# Patient Record
Sex: FEMALE | Race: WHITE | Employment: OTHER | URBAN - METROPOLITAN AREA
[De-identification: names, ages, dates, MRNs, and addresses within clinical notes are randomized per-mention and may not be internally consistent; named-entity substitution may affect disease eponyms.]

---

## 2019-03-01 ENCOUNTER — HOSPITAL ENCOUNTER (OUTPATIENT)
Dept: CARDIAC REHAB | Age: 74
Setting detail: THERAPIES SERIES
Discharge: HOME | End: 2019-03-01
Payer: MEDICARE

## 2019-03-01 PROCEDURE — 93798 PHYS/QHP OP CAR RHAB W/ECG: CPT

## 2019-03-01 RX ORDER — AMIODARONE HYDROCHLORIDE 200 MG/1
200 TABLET ORAL DAILY
COMMUNITY

## 2019-03-01 RX ORDER — NITROGLYCERIN 0.4 MG/1
0.4 TABLET SUBLINGUAL EVERY 5 MIN PRN
COMMUNITY

## 2019-03-01 RX ORDER — BUDESONIDE AND FORMOTEROL FUMARATE DIHYDRATE 80; 4.5 UG/1; UG/1
2 AEROSOL RESPIRATORY (INHALATION) 2 TIMES DAILY
COMMUNITY

## 2019-03-01 RX ORDER — LOSARTAN POTASSIUM 50 MG/1
50 TABLET ORAL DAILY
COMMUNITY

## 2019-03-01 RX ORDER — M-VIT,TX,IRON,MINS/CALC/FOLIC 27MG-0.4MG
1 TABLET ORAL DAILY
COMMUNITY

## 2019-03-01 RX ORDER — FENOFIBRATE 160 MG/1
160 TABLET ORAL DAILY
COMMUNITY

## 2019-03-01 RX ORDER — CARVEDILOL 25 MG/1
25 TABLET ORAL 2 TIMES DAILY WITH MEALS
COMMUNITY

## 2019-03-01 RX ORDER — CLOPIDOGREL BISULFATE 75 MG/1
75 TABLET ORAL DAILY
COMMUNITY

## 2019-03-01 RX ORDER — ATORVASTATIN CALCIUM 40 MG/1
40 TABLET, FILM COATED ORAL DAILY
COMMUNITY

## 2019-03-04 ENCOUNTER — HOSPITAL ENCOUNTER (OUTPATIENT)
Dept: CARDIAC REHAB | Age: 74
Setting detail: THERAPIES SERIES
Discharge: HOME | End: 2019-03-04
Payer: MEDICARE

## 2019-03-04 PROCEDURE — 93798 PHYS/QHP OP CAR RHAB W/ECG: CPT

## 2019-03-06 ENCOUNTER — HOSPITAL ENCOUNTER (OUTPATIENT)
Dept: CARDIAC REHAB | Age: 74
Setting detail: THERAPIES SERIES
Discharge: HOME | End: 2019-03-06
Payer: MEDICARE

## 2019-03-06 PROCEDURE — 93798 PHYS/QHP OP CAR RHAB W/ECG: CPT

## 2019-03-08 ENCOUNTER — HOSPITAL ENCOUNTER (OUTPATIENT)
Dept: CARDIAC REHAB | Age: 74
Setting detail: THERAPIES SERIES
Discharge: HOME | End: 2019-03-08
Payer: MEDICARE

## 2019-03-08 PROCEDURE — 93798 PHYS/QHP OP CAR RHAB W/ECG: CPT

## 2019-03-11 ENCOUNTER — HOSPITAL ENCOUNTER (OUTPATIENT)
Dept: CARDIAC REHAB | Age: 74
Setting detail: THERAPIES SERIES
Discharge: HOME | End: 2019-03-11
Payer: MEDICARE

## 2019-03-11 PROCEDURE — 93798 PHYS/QHP OP CAR RHAB W/ECG: CPT

## 2019-03-13 ENCOUNTER — HOSPITAL ENCOUNTER (OUTPATIENT)
Dept: CARDIAC REHAB | Age: 74
Setting detail: THERAPIES SERIES
Discharge: HOME | End: 2019-03-13
Payer: MEDICARE

## 2019-03-13 PROCEDURE — 93798 PHYS/QHP OP CAR RHAB W/ECG: CPT

## 2019-03-15 ENCOUNTER — HOSPITAL ENCOUNTER (OUTPATIENT)
Dept: CARDIAC REHAB | Age: 74
Setting detail: THERAPIES SERIES
Discharge: HOME | End: 2019-03-15
Payer: MEDICARE

## 2019-03-15 PROCEDURE — 93798 PHYS/QHP OP CAR RHAB W/ECG: CPT

## 2019-03-18 ENCOUNTER — HOSPITAL ENCOUNTER (OUTPATIENT)
Dept: CARDIAC REHAB | Age: 74
Setting detail: THERAPIES SERIES
Discharge: HOME | End: 2019-03-18
Payer: MEDICARE

## 2019-03-18 PROCEDURE — 93798 PHYS/QHP OP CAR RHAB W/ECG: CPT

## 2019-03-20 ENCOUNTER — HOSPITAL ENCOUNTER (OUTPATIENT)
Dept: CARDIAC REHAB | Age: 74
Setting detail: THERAPIES SERIES
Discharge: HOME | End: 2019-03-20
Payer: MEDICARE

## 2019-03-20 PROCEDURE — 93798 PHYS/QHP OP CAR RHAB W/ECG: CPT

## 2019-03-22 ENCOUNTER — HOSPITAL ENCOUNTER (OUTPATIENT)
Dept: CARDIAC REHAB | Age: 74
Setting detail: THERAPIES SERIES
Discharge: HOME | End: 2019-03-22
Payer: MEDICARE

## 2019-03-22 ENCOUNTER — APPOINTMENT (OUTPATIENT)
Dept: CARDIAC REHAB | Age: 74
End: 2019-03-22
Payer: MEDICARE

## 2019-03-22 PROCEDURE — 93798 PHYS/QHP OP CAR RHAB W/ECG: CPT

## 2019-03-25 ENCOUNTER — APPOINTMENT (OUTPATIENT)
Dept: CARDIAC REHAB | Age: 74
End: 2019-03-25
Payer: MEDICARE

## 2019-03-27 ENCOUNTER — APPOINTMENT (OUTPATIENT)
Dept: CARDIAC REHAB | Age: 74
End: 2019-03-27
Payer: MEDICARE

## 2019-03-29 ENCOUNTER — APPOINTMENT (OUTPATIENT)
Dept: CARDIAC REHAB | Age: 74
End: 2019-03-29
Payer: MEDICARE

## 2019-04-01 ENCOUNTER — APPOINTMENT (OUTPATIENT)
Dept: CARDIAC REHAB | Age: 74
End: 2019-04-01
Payer: MEDICARE

## 2019-04-03 ENCOUNTER — APPOINTMENT (OUTPATIENT)
Dept: CARDIAC REHAB | Age: 74
End: 2019-04-03
Payer: MEDICARE

## 2019-04-03 ENCOUNTER — HOSPITAL ENCOUNTER (OUTPATIENT)
Dept: CARDIAC REHAB | Age: 74
Setting detail: THERAPIES SERIES
Discharge: HOME | End: 2019-04-03
Payer: MEDICARE

## 2019-04-03 PROCEDURE — 93798 PHYS/QHP OP CAR RHAB W/ECG: CPT

## 2019-04-05 ENCOUNTER — HOSPITAL ENCOUNTER (OUTPATIENT)
Dept: CARDIAC REHAB | Age: 74
Setting detail: THERAPIES SERIES
Discharge: HOME | End: 2019-04-05
Payer: MEDICARE

## 2019-04-05 ENCOUNTER — APPOINTMENT (OUTPATIENT)
Dept: CARDIAC REHAB | Age: 74
End: 2019-04-05
Payer: MEDICARE

## 2019-04-05 PROCEDURE — 93798 PHYS/QHP OP CAR RHAB W/ECG: CPT

## 2019-04-08 ENCOUNTER — APPOINTMENT (OUTPATIENT)
Dept: CARDIAC REHAB | Age: 74
End: 2019-04-08
Payer: MEDICARE

## 2019-04-08 ENCOUNTER — HOSPITAL ENCOUNTER (OUTPATIENT)
Dept: CARDIAC REHAB | Age: 74
Setting detail: THERAPIES SERIES
Discharge: HOME | End: 2019-04-08
Payer: MEDICARE

## 2019-04-08 PROCEDURE — 93798 PHYS/QHP OP CAR RHAB W/ECG: CPT

## 2019-04-10 ENCOUNTER — HOSPITAL ENCOUNTER (OUTPATIENT)
Dept: CARDIAC REHAB | Age: 74
Setting detail: THERAPIES SERIES
Discharge: HOME | End: 2019-04-10
Payer: MEDICARE

## 2019-04-10 ENCOUNTER — APPOINTMENT (OUTPATIENT)
Dept: CARDIAC REHAB | Age: 74
End: 2019-04-10
Payer: MEDICARE

## 2019-04-10 PROCEDURE — 93798 PHYS/QHP OP CAR RHAB W/ECG: CPT

## 2019-04-12 ENCOUNTER — HOSPITAL ENCOUNTER (OUTPATIENT)
Dept: CARDIAC REHAB | Age: 74
Setting detail: THERAPIES SERIES
Discharge: HOME | End: 2019-04-12
Payer: MEDICARE

## 2019-04-12 ENCOUNTER — APPOINTMENT (OUTPATIENT)
Dept: CARDIAC REHAB | Age: 74
End: 2019-04-12
Payer: MEDICARE

## 2019-04-12 PROCEDURE — 93798 PHYS/QHP OP CAR RHAB W/ECG: CPT

## 2019-04-15 ENCOUNTER — HOSPITAL ENCOUNTER (OUTPATIENT)
Dept: CARDIAC REHAB | Age: 74
Setting detail: THERAPIES SERIES
Discharge: HOME | End: 2019-04-15
Payer: MEDICARE

## 2019-04-15 ENCOUNTER — APPOINTMENT (OUTPATIENT)
Dept: CARDIAC REHAB | Age: 74
End: 2019-04-15
Payer: MEDICARE

## 2019-04-15 PROCEDURE — 93798 PHYS/QHP OP CAR RHAB W/ECG: CPT

## 2019-04-17 ENCOUNTER — HOSPITAL ENCOUNTER (OUTPATIENT)
Dept: CARDIAC REHAB | Age: 74
Setting detail: THERAPIES SERIES
Discharge: HOME | End: 2019-04-17
Payer: MEDICARE

## 2019-04-17 ENCOUNTER — APPOINTMENT (OUTPATIENT)
Dept: CARDIAC REHAB | Age: 74
End: 2019-04-17
Payer: MEDICARE

## 2019-04-17 PROCEDURE — 93798 PHYS/QHP OP CAR RHAB W/ECG: CPT

## 2019-04-19 ENCOUNTER — APPOINTMENT (OUTPATIENT)
Dept: CARDIAC REHAB | Age: 74
End: 2019-04-19
Payer: MEDICARE

## 2019-04-22 ENCOUNTER — APPOINTMENT (OUTPATIENT)
Dept: CARDIAC REHAB | Age: 74
End: 2019-04-22
Payer: MEDICARE

## 2019-04-22 ENCOUNTER — HOSPITAL ENCOUNTER (OUTPATIENT)
Dept: CARDIAC REHAB | Age: 74
Setting detail: THERAPIES SERIES
Discharge: HOME | End: 2019-04-22
Payer: MEDICARE

## 2019-04-22 PROCEDURE — 93798 PHYS/QHP OP CAR RHAB W/ECG: CPT

## 2019-04-22 NOTE — PROGRESS NOTES
[facility]  Phase 2 Cardiac Rehabilitation  Individualized Cardiac Treatment Plan    Patient Name:  Faye Ugalde :  1945 Age:  69 y.o. Account Number:  [de-identified]  Diagnosis:  Near syncope event, VT, PTCA with stent to RCA, ICD placement  Date of Event: 2019 admission to [facility]  Physician: Dr. Rod Hassan  Next Office Visit:  3/20/2019    Risk Stratifications: ()Low (x)Intermediate ()High  Allergies: No Known Allergies  Phase 1: No; outside referral    .  Primary Diagnosis  Near syncope event, VT, PTCA with stent to RCA (2019), ICD placement (2019)    Cardiac History  History of symptoms related to cardiac event. (Note frequency, duration, location, radiation, quality, predisposing factors, other symptoms and what relieved symptoms)     Mrs. Carolina is a 69year old female with a past medical history significant for recent vaginal prolapse repair (10/30/2018), GERD, AA, arhritis (left knee), hypertension, hyperlipidemia, and coronary artery disease with MI/stents in the past who presented to [facility] on 2019 following complaints of chest pain radiating to her neck. Her  called EMS and she was taken to [facility]. She experienced a 36 second run of Vtach while in the hospital. She reports near syncope episodes in the past related to previous cardiac admissions. Her earliest cardiac event is reported to have occurred in . She reports she did not have a PCI at that time, stating her doctor told her 'the damage is done'. She reportedly had another cath in . On this past admission a dobutamine stress test showed a fixed wall abnormality with no new ischemia but there was a finding of an apical thrombus (started on Eliquis). She did undergo further testing and underwent cardiac catheterization which showed a 95% lesion in the RCA. This lesion was intervened upong with two overlapping SKINNY on 2019.  On 2019 she had an ICD placed (was for restroom use    BP  R arm sittin/70   L arm sittin/70    Peripheral pulses  []  no [x] yes    Edema [x] no [] yes  Location:      Fatigue  [] no  [x] yes    Numbness/tingling [x]  no   []  yes       Orthopedic/Exercise Limitations: arthritis of left knee; injection in 2018     Pain Assessment   Rate on 1 to 10 scale      [x]  No complaints of pain at this time                    [] Angina/chest pain Rates:    Relief with:       []  Incisional Pain Rates:      Relief with:        []  Muscle / Joint / Arthritic    Rates:     Relief with:         []  Leg Pain     Rates:    Relief with:         []  Other        Fall Risk Assessment:  Falls in the last 3 months  [] yes [x]  no     []  Alzheimers Disease [] Cognitive Impairment      [] Balance/Gait Disturbance  []  Fall History      []  Visual impairment uncorrected [x]  Dizziness []  Uses a cane or walker    []  Other     [x]  Fall safety issues reviewed    Physical/behavioral signs of abuse/neglect  [x] no    []  yes      Do you feel safe at home   []  no    [x]  yes    Advanced Directives        [x] no   []  yes   [x]  Pt given Advanced Directive pack            Individual Cardiac Treatment Plan -EXERCISE  EXERCISE  ASSESSMENT/PLAN EXERCISE  REASSESSMENT  30 day EXERCISE   REASSESSMENT  60 day EXERCISE  REASSESSMENT  90 day EXERCISE  DISCHARGE/FOLLOW-UP   DATE: 3/1/2019 DATE: 3/29/2019 DATE: 2109 DATE:  DATE:    EXERCISE ASSESSMENT EXERCISE ASSESSMENT EXERCISE ASSESSMENT EXERCISE  ASSESSMENT EXERCISE REASSESSMENT   6 Min Walk Test  Distance walked: 1480 feet, 2.8 mph  METs: 3.1  Max HR: 107 BPM      RPE: 12    Rhythm: sinus rhythm/sinus tachycardia    Reported mild burning in neck; reports it occurs only with exertion, quickly subsides with rest      6 Min. Walk Test  Distance walked:       feet  Mets:  Max.  HR.      BPM  RPE:   Rhythm:  % changed:     Fall Risk/Other  Fall risk assessed (x)yes   Issue: n/a  Orthopedic problems (x)yes-arthritis in left knee, last injection in December 2018 ()no  Walker () [employer]   Safety issues reviewed  (x)yes   If patient has balance or orthopedic issue, action:      EXERCISE PLAN Exercise Plan EXERCISE PLAN EXERCISE PLAN EXERCISE PLAN   Interventions Interventions Interventions Interventions Interventions   Exercise Prescription/Order   Exercise Prescription/Order Exercise Prescription/order Exercise   Prescription/Order Discharge Exercise Plan                Mode       1. TM at 2.2 mph for 20 minutes   2. NS at level 1 for 10 minutes   3. UBE at 5 browning for 10 minutes    Mode      TM 2.6/1 x 20 minutes  NS  2/42 x 10 min      UBE  5 for 10 min       Mode      TM  2.8/1 x 20 minutes  NS  4/41 x 10 min      UBE  10 x 10 min       Mode      TM  NS  Ellipt/Arc  Bike  UBE  Scifit   Continue independent exercise [] Y    Continue in Phase IV [] Y    Aerobic Mode:     Frequency: 2-3  Week  Duration: 15-30 min  Intensity: RPE 11-13     Frequency:   3 x week   Duration: 20-30 min  Intensity:    RPE 11-13     Frequency: 3 x week  Duration: 20-40 min. Intensity:    RPE 11-14     Frequency: 3 x week  Duration: 30-45 min  Intensity:  THR ___or RPE 11-14 Frequency:      Exercise 3-5 days per week. [] yes[] no  []   30+ min. Of exercise per session    [] yes [] no     Progression:  Depending on patient condition, time and intensity will be increased. An initial met level< 3 is classified as \"light\". Progression to \"moderate\" 3-6 mets or higher for patients in better physical condition. Resistance Training  [x] yes  [] no         Max. Met level:    Session #: 10    Resistance Training  [] yes  [x] no  Type:    Symptoms with Exercise[x] yes [] no      See   below Max. Met Level:    Session#:  18    Resistance Training  [x] yes [] no  Type:  Did in class once  Symptoms with Exercise[] yes[x] no   Max. Met. Level:    Session #:    Resistance Training  [] yes [] no  Type:    Symptoms with Exercise [] yes [] no Max.  Met level: Sessions#:    Home Resistance Training [] Y[] N  Type:   Home Exercise Plan and Goals  Shannon plans:  Exercise (x)yes ()no  Frequency: 2-3 days weekly  Duration: 15-20 minutes  Mode: home stationary bike    Discharge Goal:  30+ min. Of aerobic exercise 3-5 days/week       Home Exercise Goal  Reassessed  Exercising [x] yes[] no  Frequency:  Duration:  Mode:         Home Exercise Goal Reassessed  Exercising [x] yes [] no  Frequency:  Duration:  Mode:Walking for 20 minutes outdoors 2 x a week    Station bike for 20 minutes 1-2 x week     Home Exercise Goal Reassessed  Exercising [] yes [] no  Frequency:  Duration:  Mode:           See above plan   Education Plan Education Plan Education Plan Education Plan Education Completed   Orientation to:  [x] Y [] N Rehab/Routine  [x] Y[] N RPE Scale  [x] Y [] N Exercise Safety  [x] Y [] N S/S to Report  [x] Y [] N Infection Control      Understand/Review  [x] Y [] N RPE Scale  [x] Y [] N Exercise Safety  [x] Y [] N Equipment Orientation  [x] Y [] N S/S to Report  [x] Y [] N Warm Up/Cool Down      Attends Ed Class:  [x]  Benefits of Exercise   []   Resistance Training 101  []   Benefits of Cardiac Rehab    [x] Patient is competent with stretches/equipment  []  Patient continues to need assist with equipment/routine        Attends Ed. Class  []  Benefits of Exercise  []   Resistance Training 101  []  Benefits of Cardiac Rehab                                Attends Ed.  Class:  []   Benefits of Exercise   []  Resistance Training 101  []   Benefits of Cardiac Rehab    []  Y  Knows when not to exercise  []  Y Completed all 3  Education classes       Individual Cardiac Treatment Plan - Nutrition  NUTRITION  ASSESSMENT/PLAN NUTRITION  REASSESSMENT NUTRITION   REASSESSMENT NUTRITION   REASSESSMENT NUTRITION  DISCHARGE/FOLLOW-UP   NUTRITION ASSESSMENT NUTRITION REASSESSMENT NUTRITION REASSESSMENT NUTRITION   REASSESSMENT NUTRITION REASSESSMENT   Weight Management  Weight: 155.9 lbs Height: 5'7\"   BMI: 24.4 kg/m2   [] Normal  [] Overweight ()Obese    [] Recent gain:    [] Recent loss:   Patients goal weight:   145 lbs     Weight Management  Weight:                158.0       Weight Management  Weight:  158.0                     Weight   Management  Weight:         Weight Management  Weight:                  Height:    BMI:    Diet  Current Diet: low saturated fat      Diet  Dietary Improvements:   Diabetes:   [] Y  [x] N  FBS      HgA1c 6% as of 12/7/2018  Checks BS at home   [] Y  [x] N  Frequency   Range   Medications   Low BS Reaction   []  To check BS first 6 sessions before/after exercise   []  To carry snack for low BS   Most recent BS    [] Y  [x] N Any medication changes   [] Y  [x] N Problems with low sugar or high sugars with exercise. Treatment: Most recent BS    [] Y  [x] N Any medication changes Most recent BS    [] Y  [] N Any medication changes Most recent BS   [] Y  [] N Any medication changes   Lipids (as of 1/31/2019)  Hyperlipidemia  [x] Y  [] N  Total Chol: 148  HDL: 49  LDL: 59  Triglycerides: 198  Current Tx: atorvastatin 40 mg daily          [] Y [x] N Medication changes? [] Y  [x] N Recent Lipids   [] Y [x] N Medication changes? [] Y [x] N Recent Lipids    Sees PCP in June  Card. In May   Pulp in May  [] Y [] N Medication changes? [] Y [] N Recent Lipids  [] Y  [] N Medication changes? [] Y  [] N Recent lipids   Diet Assessment Tool:  Rate your plate survey  Score=  58/69  Score of 55-69 is excellent. Diet Assessment Tool:  Rate your plate survey  Score:    /69  Score of 55-69 is excellent. NUTRITION PLAN NUTRITION PLAN NUTRITION PLAN NUTRITION PLAN NUTRITION PLAN   *Interventions* *Interventions* *Interventions* *Interventions* *Interventions*   Professional Referral  Please check if needed.    [] Dietician Consult    [] Diabetes Referral Professional Referral   []  Seen by dietician for   Consult/referral   []  Diabetes referral  [] Seen by PSYCHOSOCIAL   REASSESSMENT PSYCHOSOCIAL   REASSESSMENT PSYCHOSOCIAL  DISCHARGE/FOLLOW-UP   PSYCHOSOCIAL ASSESSMENT PSYCHOSOCIAL REASSESSMENT PSYCHOSOCIAL REASSESSMENT PSYCHOSOCIAL REASSESSMENT PSYCHOSOCIAL REASSESSMENT   Behavioral Outcomes Behavioral Outcomes Behavioral Outcomes Behavioral Outcomes Behavioral Outcomes   Tool Used:  [facility] Chart Questionnaire Score 17      PHQ-9 score 4  Score 10 or > signifies moderate or > depression. Depression:        [facility] Score      PHQ-9 score:    Does patient have Family Support? [x] Yes   [] No   [] Lives alone [x]  Lives with spouse       PSYCHOSOCIAL PLAN PSYCHOSOCIAL PLAN PSYCHOSOCIAL PLAN PSYCHOSOCIAL PLAN  PSYCHOSOCIAL PLAN   Interventions Interventions Interventions Interventions Interventions    [] Physician notified of PHQ-9 score of 10 or > per protocol, as signifies moderate or > depression           PHQ-9 score:    []  Improvement   []  Unchanged   []  Notify PCP if score is worse   Is patient currently taking anti-depressant or anti-anxiety medications? [] Yes   [x] No  Current depression tx: n/a Current depression tx:   [x]  N/A Current depression tx   [] N/A: Current depression tx:   []  N/A  []  Patient has plan/treatment for anxiety/depression.    Education Education Education Education Education   Individual discussion/education of:   [x] Stress management skills   [x] Relaxation Techniques-likes to sit in a chair and play Suduko   [x] Coping Techniques Check if completed:   [] Attends Emotional Wellness class  ()Voices method of coping/ relaxation technique   Check if completed:   [] Attends Emotional wellness class   [] Voices method of coping/ relaxation technique   Check if completed:   [] Attends Emotional wellness class   [] Voices method of coping/ relaxation technique      Goals    Goals*   Initial Psychosocial Goals Set [] Yes   [x] No    Previous Psychosocial Goals Met  [] Yes   [] No   Shannon's psychosocial goals are as follows:    Declines need to establish psychosocial goals. 80year old mother lives above patient and her . She provides a lot of assistance to her, reports her  is very supportive and helpful with this, but does find it stressful at times. Usually can relax when it becomes too much. Improved PHQ9 score  Improved Mental Health  Score      1. Still taking care of Mom who lives up stairs. They are looking into a care facility. [] Improved PHQ9 score   [] Improved Mental Health score     Individual Cardiac Treatment Plan - Other:  Risk Factor/Education  CORE MEASURE  ASSESSMENT/PLAN CORE MEASURE  REASSESSMENT  CORE MEASURE  REASSESSMENT CORE MEASURE  REASSESSMENT CORE MEASURE  DISCHARGE/FOLLOW-UP   CORE MEASURE ASSESSMENT CORE MEASURE REASSESSMENT CORE MEASURE REASSESSMENT CORE MEASURE REASSESSMENT CORE MEASURE  Discharge   Hypertension   [x]Yes []No  Resting BP: 118/70  Peak Ex BP: 128/70  See medication list.   Hypertension    Resting BP:   130/78  Peak Ex BP: 142/64  Medication Changes:  []Yes []No   Hypertension    Resting BP: 148/62  Peak Ex BP:  Medication Changes:  []Yes [x]No     Hypertension    Resting BP:   Peak Ex BP:  Medication Changes:  []Yes []No    Hypertension    Resting BP:   Peak Ex BP:  Medication Changes:  []Yes []No     Tobacco Use  []Current []Former [x]Never    Years smoked:     Date Quit:   Quit date set: []Yes []No  Date:   # cigarettes smoked/day:   Smokeless Tobacco use:   []Yes  []No  Amount: n/a Tobacco Use  Change in smoking status       SMOKEFREE    Quit date:    Tobacco Use  Change in smoking status         Smoke free  Quit date:    Tobacco Use  Change in smoking status           Quit date:     Tobacco Use  Change in smoking status           Quit date:                      Learning Barriers  Please select one:  []Speech  []Literacy  [] Hearing  []Cognitive  [] Vision  [x]Ready to Learn Learning Barriers addressed:[] Y[] N  Modifications:           Other Core Measures EDUCATION PLAN Other Core Measures EDUCATION PLAN Other Core Measures EDUCATION PLAN Other Core Measures EDUCATION PLAN Other Core Measure EDUCATION PLAN   Interventions Interventions Interventions Interventions Interventions   Cardiac Risk Factor Education Needed      [x]HTN              [] Attended Education Class on Risk Factors for Heart Disease  [] Home B/P monitoring  [] Dietary Sodium Restriction      [x]Attended Education Class on Risk Factors for Heart Disease  [] Home B/P monitoring  [] Dietary Sodium Restriction          []Attended Education Class on Risk Factors for Heart Disease    []Home B/P monitoring  [] Dietary Sodium Restriction  Shannon voices 1. Understanding of risks for heart disease and how to modify their risk. 2. Understanding of importance of restricting sodium in diet. []Y [x]N  Smoking Cessation counseling needed  []Y []N Pt verbalizes readiness to quit smoking?  []Y[] N Quit date set  []Y []N Cut down cigarettes   []One on one counseling on Tobacco Cessation  [] Attend Smoking Cessation classes    []Smoking Cessation Information given  []Smoking cessation medications used:   [] One on one counseling on Tobacco Cessation. [] Attend smoking cessation classes      []Smoking cessation medications used:   [] One on one counseling on Tobacco Cessation. [] Attend smoking cessation classes        []Smoking cessation medications used: Tobacco Cessation Counseling attended  []Yes  []No  If not completed, Why? Core Measure Education Core Measure Education Core Measure Education Core Measure Education Education   [x] Cardiac Rehab Education booklet given  [x] Cardiac Rehab education class list given Attended Education Classes:  1. []  A & P of the  Heart & Body  2. []Heart Disease  3. [] Risk Factors  4. []  Cardiac Medications  5. [] Cardiac Interventions Attended Education Classes:  1. [x]  A & P of the  Heart & Body  2. [] Heart Disease  3. []  Risk Factors  4.[] ()  Cardiac Medications  5. [] Cardiac Interventions Attended Education Classes:  1. [] A & P of the  Heart & Body  2. []Heart Disease  3. [] Risk Factors  4. [] Cardiac Medications  5. []Cardiac Interventions Attend all the education classes. *Goals* Goal Reassessment Goal Reassessment Goal Reassessment *Goals*   Jacinto Initial Risk factor/education  goals are as follows:    1. Develop consistent exercise routine, begin using home stationary bike in addition to consistent attendance in cardiac rehab.  2. Increase understanding of how to follow a cardiac diet; make specific changes (see nutrition goals). 3. Lose weight (-5 to -10 lbs). 4. Improve strength and stamina   5. Increase energy level. Resting B/P < 140/90 or 130/80 if DM or CKD  [] Y [x]N Complete tobacco cessation  [x]Y []N Understanding risks of heart disease  [x]Y []N Heart failure self management     Goal Progress: 1.- 5. Working on these       Goal Progress:        1. Has developed an exercise routine    2. On going    3. On going    4. Some days having dizzyness when getting out of chair, and some days more energy than others. 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 5. Working on this   Goal Progress: Gabriele Morgan has met goals ()yes         Physician Response  [x]Initiate Individualized Treatment Plan (ITP)  []Other   Physician Response  [x] Proceed with rehab and 30 day updates per ITP. []Other     Physician Response  [x] Proceed with rehab and 30 day updates per ITP. []Other   Physician Response  [] Proceed with rehab and 30 day updates per ITP. []Other    Physician Final Signature     Comments: Initial assessment and orientation to phase II cardiac rehab. Please see specific program goals as above. Completed six minute walk for 1480 feet, complaining of burning in neck (2 out of 10) at the end of her walk. Reports this symptom often occurs with activity such as vacuuming at home. Resolves spontaneously when seated and resting.  Has not used NTG in the past, but reports a new prescription for it since recent hospitalization. Reviewed guidelines to taking it. Will carry it on her when she starts the program. Will continue to monitor. Will begin with her first exercise session on Monday, 3/4/2019. Monthly updates to follow. -JOYCE Urbano MS, RDN, LD, RN 3/1/2019    Physician's Signature: _________________ Date:________    4135   30 day update-  Frankey Beets has attended 10 sessions of cardiac rehab phase 2. On 3-22 began having while on Treadmill , throat burning and pain lasting 5-6 minutes, and eventually going away, and this was a lot like her angina pain. Was seen by Dr. Rod Hassan in the office and started on isosorbide 30 mg Q day. Plans to return to rehab on 4-3-2019. Rodrigue Yancey RN    Physicians signature:_______________________________  Date:_______________________           60 day update-    Gal Steiner has attended 18 sessions of cardiac rehab phase 2. And is tolerating it well. Back on 3- Frankey Beets had some throat burning while on the treadmill and saw Dr. Rod Hassan and was started on isosorbide 30 mg QD and has returned  To rehab and is tolerating it well, NO further complaints. She has increased her workloads on the TM and nu step. Monitor shows SR. Will continue to educate and provide support as needed. Rodrigue Husbands RN    Physicians signature:_________________________________  Date:_____________________________________      [facility]  Phase 2 Cardiac Rehabilitation  Individualized Cardiac Treatment Plan    Patient Name:  Faye Ugalde :  1945 Age:  69 y.o.   Account Number:  [de-identified]  Diagnosis:  Near syncope event, VT, PTCA with stent to RCA, ICD placement  Date of Event: 2019 admission to [facility]  Physician: Dr. Rod Hassan  Next Office Visit:  3/20/2019    Risk Stratifications: ()Low (x)Intermediate ()High  Allergies: No Known Allergies  Phase 1: No; outside referral    .  Primary Diagnosis  Near syncope event, VT, PTCA with stent to RCA (2/1/2019), ICD placement (2/5/2019)    Cardiac History  History of symptoms related to cardiac event. (Note frequency, duration, location, radiation, quality, predisposing factors, other symptoms and what relieved symptoms)     Mrs. Caorlina is a 69year old female with a past medical history significant for recent vaginal prolapse repair (10/30/2018), GERD, AA, arhritis (left knee), hypertension, hyperlipidemia, and coronary artery disease with MI/stents in the past who presented to [facility] on 1/30/2019 following complaints of chest pain radiating to her neck. Her  called EMS and she was taken to [facility]. She experienced a 36 second run of Vtach while in the hospital. She reports near syncope episodes in the past related to previous cardiac admissions. Her earliest cardiac event is reported to have occurred in 1988. She reports she did not have a PCI at that time, stating her doctor told her 'the damage is done'. She reportedly had another cath in 1996. On this past admission a dobutamine stress test showed a fixed wall abnormality with no new ischemia but there was a finding of an apical thrombus (started on Eliquis). She did undergo further testing and underwent cardiac catheterization which showed a 95% lesion in the RCA. This lesion was intervened upong with two overlapping SKINNY on 2/1/2019. On 2/5/2019 she had an ICD placed (was also started on amiodarone this admission). While hospitalized she also suffered from possible atypical pneumonia. She was treated with IV antibiotics during her hospitalization. She was also recommended to follow up regarding thyroid nodules noted on assessment during this hospitalization. She was discharged to home with the support of her . She has since had repeat imaging of her lungs and states she is to follow up with a pulmonologist next month.  She did have imaging of her thyroid which was reportedly unremarkable. She arrives today stating that her biggest complaint is that she does not feel like she has any energy. She is not exercising. She reports compliance with her medications. She weighs herself daily. She is not sleeping well, but reports this has been a struggle for her for many years. She hopes that cardiac rehab can help her recover from her event, increase her overall strength and energy.        [x]  MI (past)             []  CABG         [x]  PTCA            []  Valve Replacement    [x]  Arrhythmia-V tach    [x]  CHF   Last Admission:   [] Pacemaker        []  ICD   []  Other     Ejection fraction 41-46% per ECHO on 2019         Physical Assessment:     General Appearance   Color: [x]  Natural [] Pale ( ) Cyanotic []  Flushed [] Jaundice   Skin Integrity: intact, healing wounds (ICD placement)    Incision(s) where: healed right femoral procedural site; healing upper left chest wall incision for ICD placement   Hx of infection at incision(s) site [x] No  [] Yes      Cardiovascular Assessment/ Vital Signs    Heart rate: 67  Heart sounds:  RRR, nl S1S2  Height: 5'7\"  Weight: 155.9 lbs    Resp rate: 16  Lungs sounds: fairly CTA but somewhat diminished     Dyspnea  [x]  no  [] yes       []  Sleep Apnea    []  CPAP     []  Oxygen       Sleeping Habits: averages 3-6 hours of sleep per night (reports poor sleep, takes melatonin prn)    # of Pillows: 1     # of times up at night: wakes frequently for restroom use    BP  R arm sittin/70   L arm sittin/70    Peripheral pulses  []  no [x] yes    Edema [x] no [] yes  Location:      Fatigue  [] no  [x] yes    Numbness/tingling [x]  no   []  yes       Orthopedic/Exercise Limitations: arthritis of left knee; injection in 2018     Pain Assessment   Rate on 1 to 10 scale      [x]  No complaints of pain at this time                    [] Angina/chest pain Rates:    Relief with:       []  Incisional Pain Rates:      Relief with: []  Muscle / Joint / Arthritic    Rates:     Relief with:         []  Leg Pain     Rates:    Relief with:         []  Other        Fall Risk Assessment:  Falls in the last 3 months  [] yes [x]  no     []  Alzheimers Disease [] Cognitive Impairment      [] Balance/Gait Disturbance  []  Fall History      []  Visual impairment uncorrected [x]  Dizziness []  Uses a cane or walker    []  Other     [x]  Fall safety issues reviewed    Physical/behavioral signs of abuse/neglect  [x] no    []  yes      Do you feel safe at home   []  no    [x]  yes    Advanced Directives        [x] no   []  yes   [x]  Pt given Advanced Directive pack            Individual Cardiac Treatment Plan -EXERCISE  EXERCISE  ASSESSMENT/PLAN EXERCISE  REASSESSMENT  30 day EXERCISE   REASSESSMENT  60 day EXERCISE  REASSESSMENT  90 day EXERCISE  DISCHARGE/FOLLOW-UP   DATE: 3/1/2019 DATE: 3/29/2019 DATE:  DATE:  DATE:    EXERCISE ASSESSMENT EXERCISE ASSESSMENT EXERCISE ASSESSMENT EXERCISE  ASSESSMENT EXERCISE REASSESSMENT   6 Min Walk Test  Distance walked: 1480 feet, 2.8 mph  METs: 3.1  Max HR: 107 BPM      RPE: 12    Rhythm: sinus rhythm/sinus tachycardia    Reported mild burning in neck; reports it occurs only with exertion, quickly subsides with rest      6 Min. Walk Test  Distance walked:       feet  Mets:  Max. HR.      BPM  RPE:   Rhythm:  % changed:     Fall Risk/Other  Fall risk assessed (x)yes   Issue: n/a  Orthopedic problems (x)yes-arthritis in left knee, last injection in December 2018 ()no  Walker () [employer]()   Safety issues reviewed  (x)yes   If patient has balance or orthopedic issue, action:      EXERCISE PLAN Exercise Plan EXERCISE PLAN EXERCISE PLAN EXERCISE PLAN   Interventions Interventions Interventions Interventions Interventions   Exercise Prescription/Order   Exercise Prescription/Order Exercise Prescription/order Exercise   Prescription/Order Discharge Exercise Plan                Mode       1. TM at 2.2 mph for 20 minutes Sodium, Dash and Mediterranean Diet           Education Classes by Dietician  1. [] Nutrients & Portion Control  2. [] Fat & Fibers  3. [] Sodium, Dash and Mediterranean Diet   Patient has knowledge of:   [] Y  [] N Heart Healthy diet   [] Y [] N Nutritional guidelines    [] Y  [] N Diabetic diet      Goals Goals Reassessed Goals Reassessed Goals Reassessed Goals   Initial Nutritional Goals Set (x)Yes  ()No Previous Nutritional Goals Met?  ()Yes  ()No Previous Nutritional Goals Met?  ()Yes  ()No Previous Nutritional Goals Met?  ()Yes  ()No Previous Nutritional Goals Met?  ()Yes  ()No   Maria Dolores nutritional goals are as follows: Individual goals     1. Increase understanding of how to follow a heart healthy diet. 2. Monitor sodium more closely; limit to < 2 gm/day. 3. Lose weight (-5 to -10 lbs). Long term:  1. Improved Rate your plate score  2. Improved glucose control Review goals/ Revised:          1. - 3    Working on these   Review goals/Revised:             Review goals/Revised:                   Long Term:  1. Improved Rate your plate score  [] yes  2. Improved glucose control  [] yes   Individual Cardiac Treatment Plan - Psychosocial  PSYCHOSOCIAL  ASSESSMENT/PLAN PSYCHOSOCIAL  REASSESSMENT PSYCHOSOCIAL   REASSESSMENT PSYCHOSOCIAL   REASSESSMENT PSYCHOSOCIAL  DISCHARGE/FOLLOW-UP   PSYCHOSOCIAL ASSESSMENT PSYCHOSOCIAL REASSESSMENT PSYCHOSOCIAL REASSESSMENT PSYCHOSOCIAL REASSESSMENT PSYCHOSOCIAL REASSESSMENT   Behavioral Outcomes Behavioral Outcomes Behavioral Outcomes Behavioral Outcomes Behavioral Outcomes   Tool Used:  [facility]- Chart Questionnaire Score 17      PHQ-9 score 4  Score 10 or > signifies moderate or > depression. Depression:  Overall score  Psych/Spiritial     PHQ-9 score:    Does patient have Family Support?    [x] Yes   [] No   [] Lives alone [x]  Lives with spouse       PSYCHOSOCIAL PLAN PSYCHOSOCIAL PLAN PSYCHOSOCIAL PLAN PSYCHOSOCIAL PLAN  PSYCHOSOCIAL PLAN   Interventions Sofiade 60 MEASURE  Discharge   Hypertension   [x]Yes []No  Resting BP: 118/70  Peak Ex BP: 128/70  See medication list.   Hypertension    Resting BP:   130/78  Peak Ex BP: 142/64  Medication Changes:  []Yes []No   Hypertension    Resting BP:   Peak Ex BP:  Medication Changes:  []Yes []No     Hypertension    Resting BP:   Peak Ex BP:  Medication Changes:  []Yes []No    Hypertension    Resting BP:   Peak Ex BP:  Medication Changes:  []Yes []No     Tobacco Use  []Current []Former [x]Never    Years smoked:     Date Quit:   Quit date set: []Yes []No  Date:   # cigarettes smoked/day:   Smokeless Tobacco use:   []Yes  []No  Amount: n/a Tobacco Use  Change in smoking status       SMOKEFREE    Quit date:    Tobacco Use  Change in smoking status           Quit date:    Tobacco Use  Change in smoking status           Quit date:     Tobacco Use  Change in smoking status           Quit date:                      Learning Barriers  Please select one:  []Speech  []Literacy  [] Hearing  []Cognitive  [] Vision  [x]Ready to Learn Learning Barriers addressed:[] Y[] N  Modifications: Other Core Measures EDUCATION PLAN Other Core Measures EDUCATION PLAN Other Core Measures EDUCATION PLAN Other Core Measures EDUCATION PLAN Other Core Measure EDUCATION PLAN   Interventions Interventions Interventions Interventions Interventions   Cardiac Risk Factor Education Needed      [x]HTN              [] Attended Education Class on Risk Factors for Heart Disease  [] Home B/P monitoring  [] Dietary Sodium Restriction      []Attended Education Class on Risk Factors for Heart Disease  [] Home B/P monitoring  [] Dietary Sodium Restriction          []Attended Education Class on Risk Factors for Heart Disease    []Home B/P monitoring  [] Dietary Sodium Restriction  Shannon voices 1. Understanding of risks for heart disease and how to modify their risk. 2. Understanding of importance of restricting sodium in diet.     []Y [x]N Smoking Cessation counseling needed  []Y []N Pt verbalizes readiness to quit smoking?  []Y[] N Quit date set  []Y []N Cut down cigarettes   []One on one counseling on Tobacco Cessation  [] Attend Smoking Cessation classes    []Smoking Cessation Information given  []Smoking cessation medications used:   [] One on one counseling on Tobacco Cessation. [] Attend smoking cessation classes      []Smoking cessation medications used:   [] One on one counseling on Tobacco Cessation. [] Attend smoking cessation classes        []Smoking cessation medications used: Tobacco Cessation Counseling attended  []Yes  []No  If not completed, Why? Core Measure Education Core Measure Education Core Measure Education Core Measure Education Education   [x] Cardiac Rehab Education booklet given  [x] Cardiac Rehab education class list given Attended Education Classes:  1. []  A & P of the  Heart & Body  2. []Heart Disease  3. [] Risk Factors  4. []  Cardiac Medications  5. [] Cardiac Interventions Attended Education Classes:  1. []  A & P of the  Heart & Body  2. [] Heart Disease  3. []  Risk Factors  4.[] ()  Cardiac Medications  5. [] Cardiac Interventions Attended Education Classes:  1. [] A & P of the  Heart & Body  2. []Heart Disease  3. [] Risk Factors  4. [] Cardiac Medications  5. []Cardiac Interventions Attend all the education classes. *Goals* Goal Reassessment Goal Reassessment Goal Reassessment *Goals*   Jacinto Initial Risk factor/education  goals are as follows:    1. Develop consistent exercise routine, begin using home stationary bike in addition to consistent attendance in cardiac rehab.  2. Increase understanding of how to follow a cardiac diet; make specific changes (see nutrition goals). 3. Lose weight (-5 to -10 lbs). 4. Improve strength and stamina   5. Increase energy level.     Resting B/P < 140/90 or 130/80 if DM or CKD  [] Y [x]N Complete tobacco cessation  [x]Y []N Understanding risks of heart

## 2019-04-24 ENCOUNTER — APPOINTMENT (OUTPATIENT)
Dept: CARDIAC REHAB | Age: 74
End: 2019-04-24
Payer: MEDICARE

## 2019-04-24 ENCOUNTER — HOSPITAL ENCOUNTER (OUTPATIENT)
Dept: CARDIAC REHAB | Age: 74
Setting detail: THERAPIES SERIES
Discharge: HOME | End: 2019-04-24
Payer: MEDICARE

## 2019-04-24 PROCEDURE — 93798 PHYS/QHP OP CAR RHAB W/ECG: CPT

## 2019-04-26 ENCOUNTER — APPOINTMENT (OUTPATIENT)
Dept: CARDIAC REHAB | Age: 74
End: 2019-04-26
Payer: MEDICARE

## 2019-04-26 ENCOUNTER — HOSPITAL ENCOUNTER (OUTPATIENT)
Dept: CARDIAC REHAB | Age: 74
Setting detail: THERAPIES SERIES
Discharge: HOME | End: 2019-04-26
Payer: MEDICARE

## 2019-04-26 PROCEDURE — 93798 PHYS/QHP OP CAR RHAB W/ECG: CPT

## 2019-04-29 ENCOUNTER — HOSPITAL ENCOUNTER (OUTPATIENT)
Dept: CARDIAC REHAB | Age: 74
Setting detail: THERAPIES SERIES
Discharge: HOME | End: 2019-04-29
Payer: MEDICARE

## 2019-04-29 ENCOUNTER — APPOINTMENT (OUTPATIENT)
Dept: CARDIAC REHAB | Age: 74
End: 2019-04-29
Payer: MEDICARE

## 2019-04-29 PROCEDURE — 93798 PHYS/QHP OP CAR RHAB W/ECG: CPT

## 2019-05-01 ENCOUNTER — HOSPITAL ENCOUNTER (OUTPATIENT)
Dept: CARDIAC REHAB | Age: 74
Setting detail: THERAPIES SERIES
Discharge: HOME | End: 2019-05-01
Payer: MEDICARE

## 2019-05-01 ENCOUNTER — APPOINTMENT (OUTPATIENT)
Dept: CARDIAC REHAB | Age: 74
End: 2019-05-01
Payer: MEDICARE

## 2019-05-01 PROCEDURE — 93798 PHYS/QHP OP CAR RHAB W/ECG: CPT

## 2019-05-03 ENCOUNTER — HOSPITAL ENCOUNTER (OUTPATIENT)
Dept: CARDIAC REHAB | Age: 74
Setting detail: THERAPIES SERIES
Discharge: HOME | End: 2019-05-03
Payer: MEDICARE

## 2019-05-03 ENCOUNTER — APPOINTMENT (OUTPATIENT)
Dept: CARDIAC REHAB | Age: 74
End: 2019-05-03
Payer: MEDICARE

## 2019-05-03 PROCEDURE — 93798 PHYS/QHP OP CAR RHAB W/ECG: CPT

## 2019-05-06 ENCOUNTER — HOSPITAL ENCOUNTER (OUTPATIENT)
Dept: CARDIAC REHAB | Age: 74
Setting detail: THERAPIES SERIES
Discharge: HOME | End: 2019-05-06
Payer: MEDICARE

## 2019-05-06 ENCOUNTER — APPOINTMENT (OUTPATIENT)
Dept: CARDIAC REHAB | Age: 74
End: 2019-05-06
Payer: MEDICARE

## 2019-05-06 PROCEDURE — 93798 PHYS/QHP OP CAR RHAB W/ECG: CPT

## 2019-05-08 ENCOUNTER — HOSPITAL ENCOUNTER (OUTPATIENT)
Dept: CARDIAC REHAB | Age: 74
Setting detail: THERAPIES SERIES
Discharge: HOME | End: 2019-05-08
Payer: MEDICARE

## 2019-05-08 ENCOUNTER — APPOINTMENT (OUTPATIENT)
Dept: CARDIAC REHAB | Age: 74
End: 2019-05-08
Payer: MEDICARE

## 2019-05-08 PROCEDURE — 93798 PHYS/QHP OP CAR RHAB W/ECG: CPT

## 2019-05-10 ENCOUNTER — APPOINTMENT (OUTPATIENT)
Dept: CARDIAC REHAB | Age: 74
End: 2019-05-10
Payer: MEDICARE

## 2019-05-13 ENCOUNTER — APPOINTMENT (OUTPATIENT)
Dept: CARDIAC REHAB | Age: 74
End: 2019-05-13
Payer: MEDICARE

## 2019-05-13 ENCOUNTER — HOSPITAL ENCOUNTER (OUTPATIENT)
Dept: CARDIAC REHAB | Age: 74
Setting detail: THERAPIES SERIES
Discharge: HOME | End: 2019-05-13
Payer: MEDICARE

## 2019-05-13 PROCEDURE — 93798 PHYS/QHP OP CAR RHAB W/ECG: CPT

## 2019-05-15 ENCOUNTER — HOSPITAL ENCOUNTER (OUTPATIENT)
Dept: CARDIAC REHAB | Age: 74
Setting detail: THERAPIES SERIES
Discharge: HOME | End: 2019-05-15
Payer: MEDICARE

## 2019-05-15 ENCOUNTER — APPOINTMENT (OUTPATIENT)
Dept: CARDIAC REHAB | Age: 74
End: 2019-05-15
Payer: MEDICARE

## 2019-05-17 ENCOUNTER — APPOINTMENT (OUTPATIENT)
Dept: CARDIAC REHAB | Age: 74
End: 2019-05-17
Payer: MEDICARE

## 2019-05-17 ENCOUNTER — HOSPITAL ENCOUNTER (OUTPATIENT)
Dept: CARDIAC REHAB | Age: 74
Setting detail: THERAPIES SERIES
Discharge: HOME | End: 2019-05-17
Payer: MEDICARE

## 2019-05-17 PROCEDURE — 93798 PHYS/QHP OP CAR RHAB W/ECG: CPT

## 2019-05-20 ENCOUNTER — APPOINTMENT (OUTPATIENT)
Dept: CARDIAC REHAB | Age: 74
End: 2019-05-20
Payer: MEDICARE

## 2019-05-20 ENCOUNTER — HOSPITAL ENCOUNTER (OUTPATIENT)
Dept: CARDIAC REHAB | Age: 74
Setting detail: THERAPIES SERIES
Discharge: HOME | End: 2019-05-20
Payer: MEDICARE

## 2019-05-20 PROCEDURE — 93798 PHYS/QHP OP CAR RHAB W/ECG: CPT

## 2019-05-22 ENCOUNTER — HOSPITAL ENCOUNTER (OUTPATIENT)
Dept: CARDIAC REHAB | Age: 74
Setting detail: THERAPIES SERIES
Discharge: HOME | End: 2019-05-22
Payer: MEDICARE

## 2019-05-22 ENCOUNTER — APPOINTMENT (OUTPATIENT)
Dept: CARDIAC REHAB | Age: 74
End: 2019-05-22
Payer: MEDICARE

## 2019-05-22 PROCEDURE — 93798 PHYS/QHP OP CAR RHAB W/ECG: CPT

## 2019-05-24 ENCOUNTER — APPOINTMENT (OUTPATIENT)
Dept: CARDIAC REHAB | Age: 74
End: 2019-05-24
Payer: MEDICARE

## 2019-05-24 ENCOUNTER — HOSPITAL ENCOUNTER (OUTPATIENT)
Dept: CARDIAC REHAB | Age: 74
Setting detail: THERAPIES SERIES
Discharge: HOME | End: 2019-05-24
Payer: MEDICARE

## 2019-05-24 PROCEDURE — 93798 PHYS/QHP OP CAR RHAB W/ECG: CPT

## 2019-05-27 ENCOUNTER — APPOINTMENT (OUTPATIENT)
Dept: CARDIAC REHAB | Age: 74
End: 2019-05-27
Payer: MEDICARE

## 2019-05-29 ENCOUNTER — APPOINTMENT (OUTPATIENT)
Dept: CARDIAC REHAB | Age: 74
End: 2019-05-29
Payer: MEDICARE

## 2019-05-29 ENCOUNTER — HOSPITAL ENCOUNTER (OUTPATIENT)
Dept: CARDIAC REHAB | Age: 74
Setting detail: THERAPIES SERIES
Discharge: HOME | End: 2019-05-29
Payer: MEDICARE

## 2019-05-29 PROCEDURE — 93798 PHYS/QHP OP CAR RHAB W/ECG: CPT

## 2019-05-31 ENCOUNTER — APPOINTMENT (OUTPATIENT)
Dept: CARDIAC REHAB | Age: 74
End: 2019-05-31
Payer: MEDICARE

## 2019-05-31 ENCOUNTER — HOSPITAL ENCOUNTER (OUTPATIENT)
Dept: CARDIAC REHAB | Age: 74
Setting detail: THERAPIES SERIES
Discharge: HOME | End: 2019-05-31
Payer: MEDICARE

## 2019-05-31 PROCEDURE — 93798 PHYS/QHP OP CAR RHAB W/ECG: CPT

## 2019-05-31 NOTE — PROGRESS NOTES
[facility]  Phase 2 Cardiac Rehabilitation  Individualized Cardiac Treatment Plan    Patient Name:  Nic Aponte :  1945 Age:  69 y.o. Account Number:  [de-identified]  Diagnosis:  Near syncope event, VT, PTCA with stent to RCA, ICD placement  Date of Event: 2019 admission to [place] [address]  Physician: Dr. Roland Rodriguez  Next Office Visit:  3/20/2019    Risk Stratifications: ()Low (x)Intermediate ()High  Allergies: No Known Allergies  Phase 1: No; outside referral    .  Primary Diagnosis  Near syncope event, VT, PTCA with stent to RCA (2019), ICD placement (2019)    Cardiac History  History of symptoms related to cardiac event. (Note frequency, duration, location, radiation, quality, predisposing factors, other symptoms and what relieved symptoms)     Mrs. Carolina is a 69year old female with a past medical history significant for recent vaginal prolapse repair (10/30/2018), GERD, AA, arhritis (left knee), hypertension, hyperlipidemia, and coronary artery disease with MI/stents in the past who presented to [place] [address] on 2019 following complaints of chest pain radiating to her neck. Her  called EMS and she was taken to [place] [address]. She experienced a 36 second run of Vtach while in the hospital. She reports near syncope episodes in the past related to previous cardiac admissions. Her earliest cardiac event is reported to have occurred in . She reports she did not have a PCI at that time, stating her doctor told her 'the damage is done'. She reportedly had another cath in . On this past admission a dobutamine stress test showed a fixed wall abnormality with no new ischemia but there was a finding of an apical thrombus (started on Eliquis). She did undergo further testing and underwent cardiac catheterization which showed a 95% lesion in the RCA. This lesion was intervened upong with two overlapping SKINNY on 2019.  On 2019 she had an ICD placed (was also started on amiodarone this admission). While hospitalized she also suffered from possible atypical pneumonia. She was treated with IV antibiotics during her hospitalization. She was also recommended to follow up regarding thyroid nodules noted on assessment during this hospitalization. She was discharged to home with the support of her . She has since had repeat imaging of her lungs and states she is to follow up with a pulmonologist next month. She did have imaging of her thyroid which was reportedly unremarkable. She arrives today stating that her biggest complaint is that she does not feel like she has any energy. She is not exercising. She reports compliance with her medications. She weighs herself daily. She is not sleeping well, but reports this has been a struggle for her for many years. She hopes that cardiac rehab can help her recover from her event, increase her overall strength and energy.        [x]  MI (past)             []  CABG         [x]  PTCA            []  Valve Replacement    [x]  Arrhythmia-V tach    [x]  CHF   Last Admission:   [] Pacemaker        []  ICD   []  Other     Ejection fraction 41-46% per ECHO on 2/1/2019         Physical Assessment:     General Appearance   Color: [x]  Natural [] Pale ( ) Cyanotic []  Flushed [] Jaundice   Skin Integrity: intact, healing wounds (ICD placement)    Incision(s) where: healed right femoral procedural site; healing upper left chest wall incision for ICD placement   Hx of infection at incision(s) site [x] No  [] Yes      Cardiovascular Assessment/ Vital Signs    Heart rate: 67  Heart sounds:  RRR, nl S1S2  Height: 5'7\"  Weight: 155.9 lbs    Resp rate: 16  Lungs sounds: fairly CTA but somewhat diminished     Dyspnea  [x]  no  [] yes       []  Sleep Apnea    []  CPAP     []  Oxygen       Sleeping Habits: averages 3-6 hours of sleep per night (reports poor sleep, takes melatonin prn)    # of Pillows: 1     # of times up at night: wakes frequently for restroom use    BP  R arm sittin/70   L arm sittin/70    Peripheral pulses  []  no [x] yes    Edema [x] no [] yes  Location:      Fatigue  [] no  [x] yes    Numbness/tingling [x]  no   []  yes       Orthopedic/Exercise Limitations: arthritis of left knee; injection in 2018     Pain Assessment   Rate on 1 to 10 scale      [x]  No complaints of pain at this time                    [] Angina/chest pain Rates:    Relief with:       []  Incisional Pain Rates:      Relief with:        []  Muscle / Joint / Arthritic    Rates:     Relief with:         []  Leg Pain     Rates:    Relief with:         []  Other        Fall Risk Assessment:  Falls in the last 3 months  [] yes [x]  no     []  Alzheimers Disease [] Cognitive Impairment      [] Balance/Gait Disturbance  []  Fall History      []  Visual impairment uncorrected [x]  Dizziness []  Uses a cane or walker    []  Other     [x]  Fall safety issues reviewed    Physical/behavioral signs of abuse/neglect  [x] no    []  yes      Do you feel safe at home   []  no    [x]  yes    Advanced Directives        [x] no   []  yes   [x]  Pt given Advanced Directive pack            Individual Cardiac Treatment Plan -EXERCISE  EXERCISE  ASSESSMENT/PLAN EXERCISE  REASSESSMENT  30 day EXERCISE   REASSESSMENT  60 day EXERCISE  REASSESSMENT  90 day EXERCISE  DISCHARGE/FOLLOW-UP   DATE: 3/1/2019 DATE: 3/29/2019 DATE: 2109 DATE: 2019 DATE:    EXERCISE ASSESSMENT EXERCISE ASSESSMENT EXERCISE ASSESSMENT EXERCISE  ASSESSMENT EXERCISE REASSESSMENT   6 Min Walk Test  Distance walked: 1480 feet, 2.8 mph  METs: 3.1  Max HR: 107 BPM      RPE: 12    Rhythm: sinus rhythm/sinus tachycardia    Reported mild burning in neck; reports it occurs only with exertion, quickly subsides with rest      6 Min. Walk Test  Distance walked:       feet  Mets:  Max.  HR.      BPM  RPE:   Rhythm:  % changed:     Fall Risk/Other  Fall risk assessed (x)yes   Issue: n/a  Orthopedic problems (x)yes-arthritis in left knee, last injection in December 2018 ()no  Walker () Cleaster Nail()   Safety issues reviewed  (x)yes   If patient has balance or orthopedic issue, action:      EXERCISE PLAN Exercise Plan EXERCISE PLAN EXERCISE PLAN EXERCISE PLAN   Interventions Interventions Interventions Interventions Interventions   Exercise Prescription/Order   Exercise Prescription/Order Exercise Prescription/order Exercise   Prescription/Order Discharge Exercise Plan                Mode       1. TM at 2.2 mph for 20 minutes   2. NS at level 1 for 10 minutes   3. UBE at 5 browning for 10 minutes    Mode      TM 2.6/1 x 20 minutes  NS  2/42 x 10 min      UBE  5 for 10 min       Mode      TM  2.8/1 x 20 minutes  NS  4/41 x 10 min      UBE  10 x 10 min       Mode      TM  2.9/1 x 20 min  NS   5/40 x 10 min        UBE   10 x 10 min     Continue independent exercise [] Y    Continue in Phase IV [] Y    Aerobic Mode:     Frequency: 2-3  Week  Duration: 15-30 min  Intensity: RPE 11-13     Frequency:   3 x week   Duration: 20-30 min  Intensity:    RPE 11-13     Frequency: 3 x week  Duration: 20-40 min. Intensity:    RPE 11-14     Frequency: 3 x week  Duration: 30-45 min  Intensity:   RPE 11-14 Frequency:      Exercise 3-5 days per week. [] yes[] no  []   30+ min. Of exercise per session    [] yes [] no     Progression:  Depending on patient condition, time and intensity will be increased. An initial met level< 3 is classified as \"light\". Progression to \"moderate\" 3-6 mets or higher for patients in better physical condition. Resistance Training  [x] yes  [] no         Max. Met level:    Session #: 10    Resistance Training  [] yes  [x] no  Type:    Symptoms with Exercise[x] yes [] no      See   below Max. Met Level:    Session#:  18    Resistance Training  [x] yes [] no  Type:  Did in class once  Symptoms with Exercise[] yes[x] no   Max. Met.  Level:    Session #:  32    Resistance Training  [x] yes [] no  Type:Hand weights    Symptoms with Exercise [x] yes []   Using an stationary bike at home and walking daily Max. Met level:    Sessions#:    Home Resistance Training [] Y[] N  Type:   Home Exercise Plan and Goals  Shannon plans:  Exercise (x)yes ()no  Frequency: 2-3 days weekly  Duration: 15-20 minutes  Mode: home stationary bike    Discharge Goal:  30+ min. Of aerobic exercise 3-5 days/week       Home Exercise Goal  Reassessed  Exercising [x] yes[] no  Frequency:  Duration:  Mode:         Home Exercise Goal Reassessed  Exercising [x] yes [] no  Frequency:  Duration:  Mode:Walking for 20 minutes outdoors 2 x a week    Station bike for 20 minutes 1-2 x week     Home Exercise Goal Reassessed  Exercising [x] yes [] no  Frequency:    Duration:  Mode:      Using an stationary bike at home and walking daily for 20 minutes. See above plan   Education Plan Education Plan Education Plan Education Plan Education Completed   Orientation to:  [x] Y [] N Rehab/Routine  [x] Y[] N RPE Scale  [x] Y [] N Exercise Safety  [x] Y [] N S/S to Report  [x] Y [] N Infection Control      Understand/Review  [x] Y [] N RPE Scale  [x] Y [] N Exercise Safety  [x] Y [] N Equipment Orientation  [x] Y [] N S/S to Report  [x] Y [] N Warm Up/Cool Down      Attends Ed Class:  [x]  Benefits of Exercise   []   Resistance Training 101  []   Benefits of Cardiac Rehab    [x] Patient is competent with stretches/equipment  []  Patient continues to need assist with equipment/routine        Attends Ed. Class  []  Benefits of Exercise  []   Resistance Training 101  []  Benefits of Cardiac Rehab                                Attends Ed.  Class:  [x]   Benefits of Exercise   [x]  Resistance Training 101  [x]   Benefits of Cardiac Rehab    []  Y  Knows when not to exercise  []  Y Completed all 3  Education classes       Individual Cardiac Treatment Plan - Nutrition  NUTRITION  ASSESSMENT/PLAN NUTRITION  REASSESSMENT NUTRITION   REASSESSMENT NUTRITION   REASSESSMENT NUTRITION  DISCHARGE/FOLLOW-UP   NUTRITION ASSESSMENT NUTRITION REASSESSMENT NUTRITION REASSESSMENT NUTRITION   REASSESSMENT NUTRITION REASSESSMENT   Weight Management  Weight: 155.9 lbs Height: 5'7\"   BMI: 24.4 kg/m2   [] Normal  [] Overweight ()Obese    [] Recent gain:    [] Recent loss:   Patients goal weight:   145 lbs     Weight Management  Weight:                158.0       Weight Management  Weight:  158.0                     Weight   Management  Weight:158.0       Weight Management  Weight:                  Height:    BMI:    Diet  Current Diet: low saturated fat      Diet  Dietary Improvements:   Diabetes:   [] Y  [x] N  FBS      HgA1c 6% as of 12/7/2018  Checks BS at home   [] Y  [x] N  Frequency   Range   Medications   Low BS Reaction   []  To check BS first 6 sessions before/after exercise   []  To carry snack for low BS   Most recent BS    [] Y  [x] N Any medication changes   [] Y  [x] N Problems with low sugar or high sugars with exercise. Treatment: Most recent BS    [] Y  [x] N Any medication changes Most recent BS    [] Y  [x] N Any medication changes Most recent BS   [] Y  [] N Any medication changes   Lipids (as of 1/31/2019)  Hyperlipidemia  [x] Y  [] N  Total Chol: 148  HDL: 49  LDL: 59  Triglycerides: 198  Current Tx: atorvastatin 40 mg daily          [] Y [x] N Medication changes? [] Y  [x] N Recent Lipids   [] Y [x] N Medication changes? [] Y [x] N Recent Lipids    Sees PCP in June  Card. In May   Pulm. in May  [] Y [x] N Medication changes? [] Y [] Nimesh Augustin Goes in June  Dr. Annabel Mendez in June for /Echo  Dr. Jase Parks   For biopsy of lungs ? Fungus or  mold    [] Y  [] N Medication changes? [] Y  [] N Recent lipids   Diet Assessment Tool:  Rate your plate survey  Score=  58/69  Score of 55-69 is excellent. Diet Assessment Tool:  Rate your plate survey  Score:    /69  Score of 55-69 is excellent.    NUTRITION PLAN NUTRITION PLAN NUTRITION PLAN NUTRITION PLAN NUTRITION PLAN   *Interventions* *Interventions* *Interventions* *Interventions* *Interventions*   Professional Referral  Please check if needed. [] Dietician Consult    [] Diabetes Referral Professional Referral   []  Seen by dietician for   Consult/referral   []  Diabetes referral  [] Seen by dietician for consult/ referral   []  Seen for Diabetes Referral   Has patient completed consult if needed? [] Y [] N Met with dietician   [] Y  [] N Met with diabetes educator   Nutrition Education Nutrition Education Nutrition Education Nutrition Education Nutrition Education    [x] Individual Nutrition Counseling by staff/dietician    []  Individual Nutrition Counseling   []  Diabetic education if needed    Education Classes by dietician  1. []  Nutrients and Portion control  2. [] Fats & Fiber  3. []  Sodium, Dash & Mediterranean Diet               Education Classes by dietician  1. [x] Nutrients & Portion Control  2. [x] Fats & Fibers  3. [x]  Sodium, Dash and Mediterranean Diet           Education Classes by Dietician  1. [x] Nutrients & Portion Control  2. [x] Fat & Fibers  3. [x] Sodium, Dash and Mediterranean Diet   Patient has knowledge of:   [] Y  [] N Heart Healthy diet   [] Y [] N Nutritional guidelines    [] Y  [] N Diabetic diet      Goals Goals Reassessed Goals Reassessed Goals Reassessed Goals   Initial Nutritional Goals Set (x)Yes  ()No Previous Nutritional Goals Met?  ()Yes  ()No Previous Nutritional Goals Met?  ()Yes  ()No Previous Nutritional Goals Met?  ()Yes  ()No Previous Nutritional Goals Met?  ()Yes  ()No   Shannon's nutritional goals are as follows: Individual goals     1. Increase understanding of how to follow a heart healthy diet. 2. Monitor sodium more closely; limit to < 2 gm/day. 3. Lose weight (-5 to -10 lbs). Long term:  1. Improved Rate your plate score  2.  Improved glucose control Review goals/ Revised:          1. - 3    Working on these   Review goals/Revised:          1. 2. .3-    Classes are helpful, is reading labels and ongoing. Review goals/Revised:      1. Feels classes helpful and eating better. 2. Reading lables and trying to keep sodium under 2 gms. 3.  Feels hungry at night. Long Term:  1. Improved Rate your plate score  [] yes  2. Improved glucose control  [] yes   Individual Cardiac Treatment Plan - Psychosocial  PSYCHOSOCIAL  ASSESSMENT/PLAN PSYCHOSOCIAL  REASSESSMENT PSYCHOSOCIAL   REASSESSMENT PSYCHOSOCIAL   REASSESSMENT PSYCHOSOCIAL  DISCHARGE/FOLLOW-UP   PSYCHOSOCIAL ASSESSMENT PSYCHOSOCIAL REASSESSMENT PSYCHOSOCIAL REASSESSMENT PSYCHOSOCIAL REASSESSMENT PSYCHOSOCIAL REASSESSMENT   Behavioral Outcomes Behavioral Outcomes Behavioral Outcomes Behavioral Outcomes Behavioral Outcomes   Tool Used:  [facility]- Chart Questionnaire Score 17      PHQ-9 score 4  Score 10 or > signifies moderate or > depression. Depression:        [facility]- Score      PHQ-9 score:    Does patient have Family Support? [x] Yes   [] No   [] Lives alone [x]  Lives with spouse       PSYCHOSOCIAL PLAN PSYCHOSOCIAL PLAN PSYCHOSOCIAL PLAN PSYCHOSOCIAL PLAN  PSYCHOSOCIAL PLAN   Interventions Interventions Interventions Interventions Interventions    [] Physician notified of PHQ-9 score of 10 or > per protocol, as signifies moderate or > depression           PHQ-9 score:    []  Improvement   []  Unchanged   []  Notify PCP if score is worse   Is patient currently taking anti-depressant or anti-anxiety medications? [] Yes   [x] No  Current depression tx: n/a Current depression tx:   [x]  N/A Current depression tx   [] N/A: Current depression tx:   [x]  N/A  []  Patient has plan/treatment for anxiety/depression.    Education Education Education Education Education   Individual discussion/education of:   [x] Stress management skills   [x] Relaxation Techniques-likes to sit in a chair and play Suduko   [x] Coping Techniques Check if completed:   [] Attends Emotional Wellness class  ()Voices method of coping/ relaxation technique   Check if completed:   [] Attends Emotional wellness class   [] Voices method of coping/ relaxation technique   Check if completed:   [x] Attends Emotional wellness class   [x] Voices method of coping/ relaxation technique      Goals    Goals*   Initial Psychosocial Goals Set [] Yes   [x] No    Previous Psychosocial Goals Met  [] Yes   [] No   Shannon's psychosocial goals are as follows:    Declines need to establish psychosocial goals. 80year old mother lives above patient and her . She provides a lot of assistance to her, reports her  is very supportive and helpful with this, but does find it stressful at times. Usually can relax when it becomes too much. Improved PHQ9 score  Improved Mental Health  Score      1. Still taking care of Mom who lives up stairs. They are looking into a care facility.  1.  This  is the same        [] Improved PHQ9 score   [] Improved Mental Health score     Individual Cardiac Treatment Plan - Other:  Risk Factor/Education  CORE MEASURE  ASSESSMENT/PLAN CORE MEASURE  REASSESSMENT  CORE MEASURE  REASSESSMENT CORE MEASURE  REASSESSMENT CORE MEASURE  DISCHARGE/FOLLOW-UP   CORE MEASURE ASSESSMENT CORE MEASURE REASSESSMENT CORE MEASURE REASSESSMENT CORE MEASURE REASSESSMENT CORE MEASURE  Discharge   Hypertension   [x]Yes []No  Resting BP: 118/70  Peak Ex BP: 128/70  See medication list.   Hypertension    Resting BP:   130/78  Peak Ex BP: 142/64  Medication Changes:  []Yes []No   Hypertension    Resting BP: 148/62  Peak Ex BP:  Medication Changes:  []Yes [x]No     Hypertension    Resting BP: 118/68  Peak Ex BP:  Medication Changes:  []Yes [x]No    Hypertension    Resting BP:   Peak Ex BP:  Medication Changes:  []Yes []No     Tobacco Use  []Current []Former [x]Never    Years smoked:     Date Quit:   Quit date set: []Yes []No  Date:   # cigarettes smoked/day:   Smokeless Tobacco use:   []Yes  []No  Amount: n/a Tobacco Use  Change in smoking status       SMOKEFREE    Quit date:    Tobacco Use  Change in smoking status         Smoke free  Quit date:    Tobacco Use  Change in smoking status       SMOKEFREE  Quit date:     Tobacco Use  Change in smoking status           Quit date:                      Learning Barriers  Please select one:  []Speech  []Literacy  [] Hearing  []Cognitive  [] Vision  [x]Ready to Learn Learning Barriers addressed:[] Y[] N  Modifications: Other Core Measures EDUCATION PLAN Other Core Measures EDUCATION PLAN Other Core Measures EDUCATION PLAN Other Core Measures EDUCATION PLAN Other Core Measure EDUCATION PLAN   Interventions Interventions Interventions Interventions Interventions   Cardiac Risk Factor Education Needed      [x]HTN              [] Attended Education Class on Risk Factors for Heart Disease  [] Home B/P monitoring  [] Dietary Sodium Restriction      [x]Attended Education Class on Risk Factors for Heart Disease  [] Home B/P monitoring  [] Dietary Sodium Restriction          []Attended Education Class on Risk Factors for Heart Disease    []Home B/P monitoring  [] Dietary Sodium Restriction  Shannon voices 1. Understanding of risks for heart disease and how to modify their risk. 2. Understanding of importance of restricting sodium in diet. []Y [x]N  Smoking Cessation counseling needed  []Y []N Pt verbalizes readiness to quit smoking?  []Y[] N Quit date set  []Y []N Cut down cigarettes   []One on one counseling on Tobacco Cessation  [] Attend Smoking Cessation classes    []Smoking Cessation Information given  []Smoking cessation medications used:   [] One on one counseling on Tobacco Cessation. [] Attend smoking cessation classes      []Smoking cessation medications used:   [] One on one counseling on Tobacco Cessation. [] Attend smoking cessation classes        []Smoking cessation medications used:  Tobacco Cessation Counseling attended  []Yes  []No  If not completed, Why? Core Measure Education Core Measure Education Core Measure Education Core Measure Education Education   [x] Cardiac Rehab Education booklet given  [x] Cardiac Rehab education class list given Attended Education Classes:  1. []  A & P of the  Heart & Body  2. []Heart Disease  3. [] Risk Factors  4. []  Cardiac Medications  5. [] Cardiac Interventions Attended Education Classes:  1. [x]  A & P of the  Heart & Body  2. [] Heart Disease  3. []  Risk Factors  4.[] ()  Cardiac Medications  5. [] Cardiac Interventions Attended Education Classes:  1. [x] A & P of the  Heart & Body  2. [x]Heart Disease  3. [x] Risk Factors  4. [x] Cardiac Medications  5. [x]Cardiac Interventions Attend all the education classes. *Goals* Goal Reassessment Goal Reassessment Goal Reassessment *Goals*   Jacinto Initial Risk factor/education  goals are as follows:    1. Develop consistent exercise routine, begin using home stationary bike in addition to consistent attendance in cardiac rehab.  2. Increase understanding of how to follow a cardiac diet; make specific changes (see nutrition goals). 3. Lose weight (-5 to -10 lbs). 4. Improve strength and stamina   5. Increase energy level. Resting B/P < 140/90 or 130/80 if DM or CKD  [] Y [x]N Complete tobacco cessation  [x]Y []N Understanding risks of heart disease  [x]Y []N Heart failure self management     Goal Progress: 1.- 5. Working on these       Goal Progress:        1. Has developed an exercise routine    2. On going    3. On going    4. Some days having dizzyness when getting out of chair, and some days more energy than others. 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 5. Working on this   Goal Progress:        1. Has developed an exercise routine    2. Constantly working on learning about diet, and exercise etc.    3.  Still trying to  Lose weight. 4.  Feeling stronger, less dizziness. Worked 3 hours out in  yard at one time and did OK. 5.  Energy level is improving. increased her workloads on the TM and nu step. Monitor shows SR. Will continue to educate and provide support as needed. Carlos Cordova RN    Physicians signature:_________________________________  Date:_____________________________________      2019  Cecilio Olmstead has attended 32 sessions of cardiac rehab phase 2 and is tolerating it well. She is increasing workloads on all the modalities she works and doing well. Monitor shows SR. Will continue to educate and support as needed. Will be graduating soon. Carlos Cordova RN    Physicians signature:_________________________________  Date:__________________________                  [facility] Program  Phase 2 Cardiac Rehabilitation  Individualized Cardiac Treatment Plan    Patient Name:  Tiffany Villegas :  1945 Age:  69 y.o. Account Number:  [de-identified]  Diagnosis:  Near syncope event, VT, PTCA with stent to RCA, ICD placement  Date of Event: 2019 admission to [facility]  Physician: Dr. Declan Doyle  Next Office Visit:  3/20/2019    Risk Stratifications: ()Low (x)Intermediate ()High  Allergies: No Known Allergies  Phase 1: No; outside referral    .  Primary Diagnosis  Near syncope event, VT, PTCA with stent to RCA (2019), ICD placement (2019)    Cardiac History  History of symptoms related to cardiac event. (Note frequency, duration, location, radiation, quality, predisposing factors, other symptoms and what relieved symptoms)     Mrs. Carolina is a 69year old female with a past medical history significant for recent vaginal prolapse repair (10/30/2018), GERD, AA, arhritis (left knee), hypertension, hyperlipidemia, and coronary artery disease with MI/stents in the past who presented to [facility] on 2019 following complaints of chest pain radiating to her neck. Her  called EMS and she was taken to [facility].  She experienced a 36 second run of Vtach while in the hospital. She reports near syncope episodes in the past related to previous cardiac admissions. Her earliest cardiac event is reported to have occurred in 1988. She reports she did not have a PCI at that time, stating her doctor told her 'the damage is done'. She reportedly had another cath in 1996. On this past admission a dobutamine stress test showed a fixed wall abnormality with no new ischemia but there was a finding of an apical thrombus (started on Eliquis). She did undergo further testing and underwent cardiac catheterization which showed a 95% lesion in the RCA. This lesion was intervened upong with two overlapping SKINNY on 2/1/2019. On 2/5/2019 she had an ICD placed (was also started on amiodarone this admission). While hospitalized she also suffered from possible atypical pneumonia. She was treated with IV antibiotics during her hospitalization. She was also recommended to follow up regarding thyroid nodules noted on assessment during this hospitalization. She was discharged to home with the support of her . She has since had repeat imaging of her lungs and states she is to follow up with a pulmonologist next month. She did have imaging of her thyroid which was reportedly unremarkable. She arrives today stating that her biggest complaint is that she does not feel like she has any energy. She is not exercising. She reports compliance with her medications. She weighs herself daily. She is not sleeping well, but reports this has been a struggle for her for many years. She hopes that cardiac rehab can help her recover from her event, increase her overall strength and energy.        [x]  MI (past)             []  CABG         [x]  PTCA            []  Valve Replacement    [x]  Arrhythmia-V tach    [x]  CHF   Last Admission:   [] Pacemaker        []  ICD   []  Other     Ejection fraction 41-46% per ECHO on 2/1/2019         Physical Assessment:     General Appearance   Color: [x]  Natural [] Pale ( ) Cyanotic []  Flushed [] Jaundice   Skin Integrity: intact, healing wounds (ICD placement)    Incision(s) where: healed right femoral procedural site; healing upper left chest wall incision for ICD placement   Hx of infection at incision(s) site [x] No  [] Yes      Cardiovascular Assessment/ Vital Signs    Heart rate: 67  Heart sounds:  RRR, nl S1S2  Height: 5'7\"  Weight: 155.9 lbs    Resp rate: 16  Lungs sounds: fairly CTA but somewhat diminished     Dyspnea  [x]  no  [] yes       []  Sleep Apnea    []  CPAP     []  Oxygen       Sleeping Habits: averages 3-6 hours of sleep per night (reports poor sleep, takes melatonin prn)    # of Pillows: 1     # of times up at night: wakes frequently for restroom use    BP  R arm sittin/70   L arm sittin/70    Peripheral pulses  []  no [x] yes    Edema [x] no [] yes  Location:      Fatigue  [] no  [x] yes    Numbness/tingling [x]  no   []  yes       Orthopedic/Exercise Limitations: arthritis of left knee; injection in 2018     Pain Assessment   Rate on 1 to 10 scale      [x]  No complaints of pain at this time                    [] Angina/chest pain Rates:    Relief with:       []  Incisional Pain Rates:      Relief with:        []  Muscle / Joint / Arthritic    Rates:     Relief with:         []  Leg Pain     Rates:    Relief with:         []  Other        Fall Risk Assessment:  Falls in the last 3 months  [] yes [x]  no     []  Alzheimers Disease [] Cognitive Impairment      [] Balance/Gait Disturbance  []  Fall History      []  Visual impairment uncorrected [x]  Dizziness []  Uses a cane or walker    []  Other     [x]  Fall safety issues reviewed    Physical/behavioral signs of abuse/neglect  [x] no    []  yes      Do you feel safe at home   []  no    [x]  yes    Advanced Directives        [x] no   []  yes   [x]  Pt given Advanced Directive pack            Individual Cardiac Treatment Plan -EXERCISE  EXERCISE  ASSESSMENT/PLAN EXERCISE  REASSESSMENT  30 day EXERCISE   REASSESSMENT  60 day EXERCISE  REASSESSMENT  90 day EXERCISE  DISCHARGE/FOLLOW-UP   DATE: 3/1/2019 DATE: 3/29/2019 DATE:  DATE:  DATE:    EXERCISE ASSESSMENT EXERCISE ASSESSMENT EXERCISE ASSESSMENT EXERCISE  ASSESSMENT EXERCISE REASSESSMENT   6 Min Walk Test  Distance walked: 1480 feet, 2.8 mph  METs: 3.1  Max HR: 107 BPM      RPE: 12    Rhythm: sinus rhythm/sinus tachycardia    Reported mild burning in neck; reports it occurs only with exertion, quickly subsides with rest      6 Min. Walk Test  Distance walked:       feet  Mets:  Max. HR.      BPM  RPE:   Rhythm:  % changed:     Fall Risk/Other  Fall risk assessed (x)yes   Issue: n/a  Orthopedic problems (x)yes-arthritis in left knee, last injection in December 2018 ()no  Walker () [employer]()   Safety issues reviewed  (x)yes   If patient has balance or orthopedic issue, action:      EXERCISE PLAN Exercise Plan EXERCISE PLAN EXERCISE PLAN EXERCISE PLAN   Interventions Interventions Interventions Interventions Interventions   Exercise Prescription/Order   Exercise Prescription/Order Exercise Prescription/order Exercise   Prescription/Order Discharge Exercise Plan                Mode       1. TM at 2.2 mph for 20 minutes   2. NS at level 1 for 10 minutes   3. UBE at 5 browning for 10 minutes    Mode      TM 2.6/1 x 20 minutes  NS  2/42 x 10 min      UBE  5 for 10 min       Mode      TM  NS  Ellipt/Arc  Bike  UBE  Scifit     Mode      TM  NS  Ellipt/Arc  Bike  UBE  Scifit   Continue independent exercise [] Y    Continue in Phase IV [] Y    Aerobic Mode:     Frequency: 2-3  Week  Duration: 15-30 min  Intensity: RPE 11-13     Frequency:   3 x week   Duration: 20-30 min  Intensity:    RPE 11-13     Frequency: 3 x week  Duration: 20-40 min. Intensity:   THR ___ or RPE 11-14     Frequency: 3 x week  Duration: 30-45 min  Intensity:  THR ___or RPE 11-14 Frequency:      Exercise 3-5 days per week. [] yes[] no  []   30+ min.  Of exercise per session    [] yes [] no     Progression:  Depending on patient of 55-69 is excellent. Diet Assessment Tool:  Rate your plate survey  Score:    /69  Score of 55-69 is excellent. NUTRITION PLAN NUTRITION PLAN NUTRITION PLAN NUTRITION PLAN NUTRITION PLAN   *Interventions* *Interventions* *Interventions* *Interventions* *Interventions*   Professional Referral  Please check if needed. [] Dietician Consult    [] Diabetes Referral Professional Referral   []  Seen by dietician for   Consult/referral   []  Diabetes referral  [] Seen by dietician for consult/ referral   []  Seen for Diabetes Referral   Has patient completed consult if needed? [] Y [] N Met with dietician   [] Y  [] N Met with diabetes educator   Nutrition Education Nutrition Education Nutrition Education Nutrition Education Nutrition Education    [x] Individual Nutrition Counseling by staff/dietician    []  Individual Nutrition Counseling   []  Diabetic education if needed    Education Classes by dietician  1. []  Nutrients and Portion control  2. [] Fats & Fiber  3. []  Sodium, Dash & Mediterranean Diet               Education Classes by dietician  1. [] Nutrients & Portion Control  2. [] Fats & Fibers  3. []  Sodium, Dash and Mediterranean Diet           Education Classes by Dietician  1. [] Nutrients & Portion Control  2. [] Fat & Fibers  3. [] Sodium, Dash and Mediterranean Diet   Patient has knowledge of:   [] Y  [] N Heart Healthy diet   [] Y [] N Nutritional guidelines    [] Y  [] N Diabetic diet      Goals Goals Reassessed Goals Reassessed Goals Reassessed Goals   Initial Nutritional Goals Set (x)Yes  ()No Previous Nutritional Goals Met?  ()Yes  ()No Previous Nutritional Goals Met?  ()Yes  ()No Previous Nutritional Goals Met?  ()Yes  ()No Previous Nutritional Goals Met?  ()Yes  ()No   Shannon's nutritional goals are as follows: Individual goals     1. Increase understanding of how to follow a heart healthy diet. 2. Monitor sodium more closely; limit to < 2 gm/day.   3. Lose weight (-5 to -10 leylas).     Long term:  1. Improved Rate your plate score  2. Improved glucose control Review goals/ Revised:          1. - 3    Working on these   Review goals/Revised:             Review goals/Revised:                   Long Term:  1. Improved Rate your plate score  [] yes  2. Improved glucose control  [] yes   Individual Cardiac Treatment Plan - Psychosocial  PSYCHOSOCIAL  ASSESSMENT/PLAN PSYCHOSOCIAL  REASSESSMENT PSYCHOSOCIAL   REASSESSMENT PSYCHOSOCIAL   REASSESSMENT PSYCHOSOCIAL  DISCHARGE/FOLLOW-UP   PSYCHOSOCIAL ASSESSMENT PSYCHOSOCIAL REASSESSMENT PSYCHOSOCIAL REASSESSMENT PSYCHOSOCIAL REASSESSMENT PSYCHOSOCIAL REASSESSMENT   Behavioral Outcomes Behavioral Outcomes Behavioral Outcomes Behavioral Outcomes Behavioral Outcomes   Tool Used:  [facility] Co-[facility] Chart Questionnaire Score 17      PHQ-9 score 4  Score 10 or > signifies moderate or > depression. Depression:  Overall score  Psych/Spiritial     PHQ-9 score:    Does patient have Family Support? [x] Yes   [] No   [] Lives alone [x]  Lives with spouse       PSYCHOSOCIAL PLAN PSYCHOSOCIAL PLAN PSYCHOSOCIAL PLAN PSYCHOSOCIAL PLAN  PSYCHOSOCIAL PLAN   Interventions Interventions Interventions Interventions Interventions    [] Physician notified of PHQ-9 score of 10 or > per protocol, as signifies moderate or > depression           PHQ-9 score:    []  Improvement   []  Unchanged   []  Notify PCP if score is worse   Is patient currently taking anti-depressant or anti-anxiety medications? [] Yes   [x] No  Current depression tx: n/a Current depression tx:   [x]  N/A Current depression tx   [] N/A: Current depression tx:   []  N/A  []  Patient has plan/treatment for anxiety/depression.    Education Education Education Education Education   Individual discussion/education of:   [x] Stress management skills   [x] Relaxation Techniques-likes to sit in a chair and play Suduko   [x] Coping Techniques Check if completed:   [] Attends Emotional Wellness class  ()Voices method of coping/ relaxation technique   Check if completed:   [] Attends Emotional wellness class   [] Voices method of coping/ relaxation technique   Check if completed:   [] Attends Emotional wellness class   [] Voices method of coping/ relaxation technique      Goals    Goals*   Initial Psychosocial Goals Set [] Yes   [x] No    Previous Psychosocial Goals Met  [] Yes   [] No   Maria Dolores psychosocial goals are as follows:    Declines need to establish psychosocial goals. 80year old mother lives above patient and her . She provides a lot of assistance to her, reports her  is very supportive and helpful with this, but does find it stressful at times. Usually can relax when it becomes too much.      Improved PHQ9 score  Improved Mental Health  Score               [] Improved PHQ9 score   [] Improved Mental Health score     Individual Cardiac Treatment Plan - Other:  Risk Factor/Education  CORE MEASURE  ASSESSMENT/PLAN CORE MEASURE  REASSESSMENT  CORE MEASURE  REASSESSMENT CORE MEASURE  REASSESSMENT CORE MEASURE  DISCHARGE/FOLLOW-UP   CORE MEASURE ASSESSMENT CORE MEASURE REASSESSMENT CORE MEASURE REASSESSMENT CORE MEASURE REASSESSMENT CORE MEASURE  Discharge   Hypertension   [x]Yes []No  Resting BP: 118/70  Peak Ex BP: 128/70  See medication list.   Hypertension    Resting BP:   130/78  Peak Ex BP: 142/64  Medication Changes:  []Yes []No   Hypertension    Resting BP:   Peak Ex BP:  Medication Changes:  []Yes []No     Hypertension    Resting BP:   Peak Ex BP:  Medication Changes:  []Yes []No    Hypertension    Resting BP:   Peak Ex BP:  Medication Changes:  []Yes []No     Tobacco Use  []Current []Former [x]Never    Years smoked:     Date Quit:   Quit date set: []Yes []No  Date:   # cigarettes smoked/day:   Smokeless Tobacco use:   []Yes  []No  Amount: n/a Tobacco Use  Change in smoking status       SMOKEFREE    Quit date:    Tobacco Use  Change in smoking status           Quit date: Tobacco Use  Change in smoking status           Quit date:     Tobacco Use  Change in smoking status           Quit date:                      Learning Barriers  Please select one:  []Speech  []Literacy  [] Hearing  []Cognitive  [] Vision  [x]Ready to Learn Learning Barriers addressed:[] Y[] N  Modifications: Other Core Measures EDUCATION PLAN Other Core Measures EDUCATION PLAN Other Core Measures EDUCATION PLAN Other Core Measures EDUCATION PLAN Other Core Measure EDUCATION PLAN   Interventions Interventions Interventions Interventions Interventions   Cardiac Risk Factor Education Needed      [x]HTN              [] Attended Education Class on Risk Factors for Heart Disease  [] Home B/P monitoring  [] Dietary Sodium Restriction      []Attended Education Class on Risk Factors for Heart Disease  [] Home B/P monitoring  [] Dietary Sodium Restriction          []Attended Education Class on Risk Factors for Heart Disease    []Home B/P monitoring  [] Dietary Sodium Restriction  Shannon voices 1. Understanding of risks for heart disease and how to modify their risk. 2. Understanding of importance of restricting sodium in diet. []Y [x]N  Smoking Cessation counseling needed  []Y []N Pt verbalizes readiness to quit smoking?  []Y[] N Quit date set  []Y []N Cut down cigarettes   []One on one counseling on Tobacco Cessation  [] Attend Smoking Cessation classes    []Smoking Cessation Information given  []Smoking cessation medications used:   [] One on one counseling on Tobacco Cessation. [] Attend smoking cessation classes      []Smoking cessation medications used:   [] One on one counseling on Tobacco Cessation. [] Attend smoking cessation classes        []Smoking cessation medications used: Tobacco Cessation Counseling attended  []Yes  []No  If not completed, Why?      Core Measure Education Core Measure Education Core Measure Education Core Measure Education Education   [x] Cardiac Rehab Education booklet given  [x] Cardiac Rehab education class list given Attended Education Classes:  1. []  A & P of the  Heart & Body  2. []Heart Disease  3. [] Risk Factors  4. []  Cardiac Medications  5. [] Cardiac Interventions Attended Education Classes:  1. []  A & P of the  Heart & Body  2. [] Heart Disease  3. []  Risk Factors  4.[] ()  Cardiac Medications  5. [] Cardiac Interventions Attended Education Classes:  1. [] A & P of the  Heart & Body  2. []Heart Disease  3. [] Risk Factors  4. [] Cardiac Medications  5. []Cardiac Interventions Attend all the education classes. *Goals* Goal Reassessment Goal Reassessment Goal Reassessment *Goals*   Jacinto Initial Risk factor/education  goals are as follows:    1. Develop consistent exercise routine, begin using home stationary bike in addition to consistent attendance in cardiac rehab.  2. Increase understanding of how to follow a cardiac diet; make specific changes (see nutrition goals). 3. Lose weight (-5 to -10 lbs). 4. Improve strength and stamina   5. Increase energy level. Resting B/P < 140/90 or 130/80 if DM or CKD  [] Y [x]N Complete tobacco cessation  [x]Y []N Understanding risks of heart disease  [x]Y []N Heart failure self management     Goal Progress: 1.- 5. Working on these       Goal Progress:     Goal Progress: Cecilio Olmstead has met goals ()yes         Physician Response  [x]Initiate Individualized Treatment Plan (ITP)  []Other   Physician Response  [x] Proceed with rehab and 30 day updates per ITP. []Other     Physician Response  [] Proceed with rehab and 30 day updates per ITP. []Other   Physician Response  [] Proceed with rehab and 30 day updates per ITP. []Other    Physician Final Signature     Comments: Initial assessment and orientation to phase II cardiac rehab. Please see specific program goals as above. Completed six minute walk for 1480 feet, complaining of burning in neck (2 out of 10) at the end of her walk.  Reports this symptom often occurs with activity such as vacuuming at home. Resolves spontaneously when seated and resting. Has not used NTG in the past, but reports a new prescription for it since recent hospitalization. Reviewed guidelines to taking it. Will carry it on her when she starts the program. Will continue to monitor. Will begin with her first exercise session on Monday, 3/4/2019. Monthly updates to follow. -JOYCE Urbano MS, RDN, LD, RN 3/1/2019    Physician's Signature: _________________ Date:________    [id number]   30 day update-  Philip Rowe has attended 10 sessions of cardiac rehab phase 2. On 3-22 began having while on Treadmill , throat burning and pain lasting 5-6 minutes, and eventually going away, and this was a lot like her angina pain. Was seen by Dr. Ulisses Lehman in the office and started on isosorbide 30 mg Q day. Plans to return to rehab on 4-3-2019.   Chandrakant Morfin RN    Physicians signature:_______________________________  Date:_______________________

## 2019-06-03 ENCOUNTER — APPOINTMENT (OUTPATIENT)
Dept: CARDIAC REHAB | Age: 74
End: 2019-06-03
Payer: MEDICARE

## 2019-06-03 ENCOUNTER — HOSPITAL ENCOUNTER (OUTPATIENT)
Dept: CARDIAC REHAB | Age: 74
Setting detail: THERAPIES SERIES
Discharge: HOME | End: 2019-06-03
Payer: MEDICARE

## 2019-06-03 PROCEDURE — 93798 PHYS/QHP OP CAR RHAB W/ECG: CPT

## 2019-06-05 ENCOUNTER — APPOINTMENT (OUTPATIENT)
Dept: CARDIAC REHAB | Age: 74
End: 2019-06-05
Payer: MEDICARE

## 2019-06-05 ENCOUNTER — HOSPITAL ENCOUNTER (OUTPATIENT)
Dept: CARDIAC REHAB | Age: 74
Setting detail: THERAPIES SERIES
Discharge: HOME | End: 2019-06-05
Payer: MEDICARE

## 2019-06-05 PROCEDURE — 93798 PHYS/QHP OP CAR RHAB W/ECG: CPT

## 2019-06-07 ENCOUNTER — APPOINTMENT (OUTPATIENT)
Dept: CARDIAC REHAB | Age: 74
End: 2019-06-07
Payer: MEDICARE

## 2019-06-10 ENCOUNTER — APPOINTMENT (OUTPATIENT)
Dept: CARDIAC REHAB | Age: 74
End: 2019-06-10
Payer: MEDICARE

## 2019-06-12 ENCOUNTER — APPOINTMENT (OUTPATIENT)
Dept: CARDIAC REHAB | Age: 74
End: 2019-06-12
Payer: MEDICARE

## 2019-06-14 ENCOUNTER — APPOINTMENT (OUTPATIENT)
Dept: CARDIAC REHAB | Age: 74
End: 2019-06-14
Payer: MEDICARE

## 2019-06-17 ENCOUNTER — APPOINTMENT (OUTPATIENT)
Dept: CARDIAC REHAB | Age: 74
End: 2019-06-17
Payer: MEDICARE

## 2019-06-19 ENCOUNTER — APPOINTMENT (OUTPATIENT)
Dept: CARDIAC REHAB | Age: 74
End: 2019-06-19
Payer: MEDICARE

## 2019-06-21 ENCOUNTER — APPOINTMENT (OUTPATIENT)
Dept: CARDIAC REHAB | Age: 74
End: 2019-06-21
Payer: MEDICARE

## 2019-06-24 ENCOUNTER — APPOINTMENT (OUTPATIENT)
Dept: CARDIAC REHAB | Age: 74
End: 2019-06-24
Payer: MEDICARE

## 2024-02-08 RX ORDER — ALBUTEROL SULFATE 90 UG/1
2 AEROSOL, METERED RESPIRATORY (INHALATION) EVERY 4 HOURS PRN
COMMUNITY
Start: 2018-05-25

## 2024-02-08 NOTE — PROGRESS NOTES
[facility] PRE-OPERATIVE INSTRUCTIONS    Day of Procedure:   2/29/24             Arrival time:       0800         Surgery time:10:30    Take the following medications with a sip of water:  Follow your MD/Surgeons pre-procedure instructions regarding your medications     Do not eat or drink anything after 12:00 midnight prior to your surgery.  This includes water chewing gum, mints and ice chips.   You may brush your teeth and gargle the morning of your surgery, but do not swallow the water     Please see your family doctor/pediatrician for a history and physical and/or concerning medications.   Bring any test results/reports from your physicians office.   If you are under the care of a heart doctor or specialist doctor, please be aware that you may be asked to them for clearance    You may be asked to stop blood thinners such as Coumadin, Plavix, Fragmin, Lovenox, etc., or any anti-inflammatories such as:  Aspirin, Ibuprofen, Advil, Naproxen prior to your surgery.    We also ask that you stop any OTC medications such as fish oil, vitamin E, glucosamine, garlic, Multivitamins, COQ 10, etc. MAY TAKE TYLENOL-STOP ELIQUIS AS DIRECTED    We ask that you do not smoke 24 hours prior to surgery  We ask that you do not  drink any alcoholic beverages 24 hours prior to surgery     You must make arrangements for a responsible adult to take you home after your surgery.    For your safety you will not be allowed to leave alone or drive yourself home.  Your surgery will be cancelled if you do not have a ride home.     Also for your safety, it is strongly suggested that someone stay with you the first 24 hours after your surgery.     A parent or legal guardian must accompany a child scheduled for surgery and plan to stay at the hospital until the child is discharged.    Please do not bring other children with you.    For your comfort, please wear simple loose fitting clothing to the hospital.  Please do not bring

## 2024-02-08 NOTE — PROGRESS NOTES
[facility] Pre-Admission Testing Electronic Communication Worksheet for OR/ENDO Procedures        Patient: Shannon Carolina    DOS: 2/29/24    Arrival Time: 9AM    Surgery Time: 10:30AM    Meds to Bed:  [] YES    [x]  NO    Transportation Confirmed: [x] YES    []  NO BROTHER-GENIE    History and Physical:  [] YES    []  NO  [x] N/A  If yes, please list doctor or Urgent Care and date of H&P: [facility]    Additional Clearance(Cardiac, Pulmonary, etc):  [] YES    [x]  NO    Pre-Admission Testing Visit:  [] YES    [x]  NO If no, do labs/testing need to be done DOS?  [] YES    [x]  NO    Medication Reconciliation Complete:  [x] YES    []  NO        Additional Notes:    SCREENING    PATIENT HAS ICD ([employer])            Interview Complete: [x] YES    []  NO          Sandra Austin, RN  3:59 PM

## 2024-02-28 ENCOUNTER — ANESTHESIA EVENT (OUTPATIENT)
Dept: ENDOSCOPY | Age: 79
End: 2024-02-28
Payer: MEDICARE

## 2024-02-29 ENCOUNTER — ANESTHESIA (OUTPATIENT)
Dept: ENDOSCOPY | Age: 79
End: 2024-02-29
Payer: MEDICARE

## 2024-02-29 ENCOUNTER — HOSPITAL ENCOUNTER (OUTPATIENT)
Age: 79
Setting detail: OUTPATIENT SURGERY
Discharge: HOME | End: 2024-02-29
Attending: INTERNAL MEDICINE | Admitting: INTERNAL MEDICINE
Payer: MEDICARE

## 2024-02-29 VITALS
HEART RATE: 74 BPM | BODY MASS INDEX: 23.15 KG/M2 | OXYGEN SATURATION: 96 % | SYSTOLIC BLOOD PRESSURE: 164 MMHG | TEMPERATURE: 98 F | RESPIRATION RATE: 14 BRPM | WEIGHT: 147.49 LBS | DIASTOLIC BLOOD PRESSURE: 88 MMHG | HEIGHT: 67 IN

## 2024-02-29 DIAGNOSIS — Z12.11 COLON CANCER SCREENING: ICD-10-CM

## 2024-02-29 PROCEDURE — 7100000011 HC PHASE II RECOVERY - ADDTL 15 MIN: Performed by: INTERNAL MEDICINE

## 2024-02-29 PROCEDURE — 7100000001 HC PACU RECOVERY - ADDTL 15 MIN: Performed by: INTERNAL MEDICINE

## 2024-02-29 PROCEDURE — 3609010600 HC COLONOSCOPY POLYPECTOMY SNARE/COLD BIOPSY: Performed by: INTERNAL MEDICINE

## 2024-02-29 PROCEDURE — 3700000000 HC ANESTHESIA ATTENDED CARE: Performed by: INTERNAL MEDICINE

## 2024-02-29 PROCEDURE — 7100000010 HC PHASE II RECOVERY - FIRST 15 MIN: Performed by: INTERNAL MEDICINE

## 2024-02-29 PROCEDURE — 2500000003 HC RX 250 WO HCPCS

## 2024-02-29 PROCEDURE — 3700000001 HC ADD 15 MINUTES (ANESTHESIA): Performed by: INTERNAL MEDICINE

## 2024-02-29 PROCEDURE — 2709999900 HC NON-CHARGEABLE SUPPLY: Performed by: INTERNAL MEDICINE

## 2024-02-29 PROCEDURE — 7100000000 HC PACU RECOVERY - FIRST 15 MIN: Performed by: INTERNAL MEDICINE

## 2024-02-29 PROCEDURE — 2580000003 HC RX 258: Performed by: ANESTHESIOLOGY

## 2024-02-29 PROCEDURE — 6360000002 HC RX W HCPCS

## 2024-02-29 PROCEDURE — 88305 TISSUE EXAM BY PATHOLOGIST: CPT

## 2024-02-29 RX ORDER — SODIUM CHLORIDE 0.9 % (FLUSH) 0.9 %
5-40 SYRINGE (ML) INJECTION PRN
Status: DISCONTINUED | OUTPATIENT
Start: 2024-02-29 | End: 2024-02-29 | Stop reason: HOSPADM

## 2024-02-29 RX ORDER — SODIUM CHLORIDE 9 MG/ML
INJECTION, SOLUTION INTRAVENOUS PRN
Status: DISCONTINUED | OUTPATIENT
Start: 2024-02-29 | End: 2024-02-29 | Stop reason: HOSPADM

## 2024-02-29 RX ORDER — PROPOFOL 10 MG/ML
INJECTION, EMULSION INTRAVENOUS PRN
Status: DISCONTINUED | OUTPATIENT
Start: 2024-02-29 | End: 2024-02-29 | Stop reason: SDUPTHER

## 2024-02-29 RX ORDER — SODIUM CHLORIDE 0.9 % (FLUSH) 0.9 %
5-40 SYRINGE (ML) INJECTION EVERY 12 HOURS SCHEDULED
Status: DISCONTINUED | OUTPATIENT
Start: 2024-02-29 | End: 2024-02-29 | Stop reason: HOSPADM

## 2024-02-29 RX ORDER — LIDOCAINE HYDROCHLORIDE 20 MG/ML
INJECTION, SOLUTION EPIDURAL; INFILTRATION; INTRACAUDAL; PERINEURAL PRN
Status: DISCONTINUED | OUTPATIENT
Start: 2024-02-29 | End: 2024-02-29 | Stop reason: SDUPTHER

## 2024-02-29 RX ADMIN — PROPOFOL 60 MG: 10 INJECTION, EMULSION INTRAVENOUS at 09:09

## 2024-02-29 RX ADMIN — LIDOCAINE HYDROCHLORIDE 50 MG: 20 INJECTION, SOLUTION EPIDURAL; INFILTRATION; INTRACAUDAL; PERINEURAL at 09:09

## 2024-02-29 RX ADMIN — PROPOFOL 150 MCG/KG/MIN: 10 INJECTION, EMULSION INTRAVENOUS at 09:10

## 2024-02-29 RX ADMIN — SODIUM CHLORIDE: 9 INJECTION, SOLUTION INTRAVENOUS at 08:31

## 2024-02-29 ASSESSMENT — PAIN - FUNCTIONAL ASSESSMENT
PAIN_FUNCTIONAL_ASSESSMENT: 0-10
PAIN_FUNCTIONAL_ASSESSMENT: NONE - DENIES PAIN

## 2024-02-29 NOTE — PROGRESS NOTES
Pt hypertensive, states took BP meds at approx 0530 this morning. Dr alexis aware, states does not want to treat at this time.

## 2024-02-29 NOTE — ANESTHESIA POSTPROCEDURE EVALUATION
Department of Anesthesiology  Postprocedure Note    Patient: Shannon Carolina  MRN: [id number]  YOB: 1945  Date of evaluation: 2/29/2024    Procedure Summary       Date: 02/29/24 Room / Location: [address]    Anesthesia Start: 0905 Anesthesia Stop: 0939    Procedure: COLONOSCOPY POLYPECTOMY SNARE/COLD BIOPSY Diagnosis:       Colon cancer screening      (Colon cancer screening [Z12.11])    Surgeons: Lisseth Bahena MD Responsible Provider: Aristides Zuniga MD    Anesthesia Type: MAC ASA Status: 3            Anesthesia Type: MAC    Noni Phase I: Noni Score: 10    Noni Phase II: Noni Score: 10    Anesthesia Post Evaluation    Patient location during evaluation: PACU  Patient participation: complete - patient participated  Level of consciousness: awake and alert  Pain score: 0  Airway patency: patent  Nausea & Vomiting: no nausea and no vomiting  Cardiovascular status: blood pressure returned to baseline  Respiratory status: acceptable  Hydration status: euvolemic  Pain management: adequate    No notable events documented.

## 2024-02-29 NOTE — H&P
Pre-operative History and Physical    Patient: Shannon Carolina  : 1945  Acct#:     Intended Procedure:  Colonoscopy     HISTORY OF PRESENT ILLNESS:  The patient is a 79 y.o. female  who presents for/due to history of colon polyps       Past Medical History:        Diagnosis Date    Arthritis     Asthma     CAD (coronary artery disease)     COPD (chronic obstructive pulmonary disease) (HCC)     Fatty liver     Heart attack (HCC)     STENTS X3    Hyperlipidemia     Hypertension      Past Surgical History:        Procedure Laterality Date    ARM SURGERY Right     AGE 7    CARDIAC DEFIBRILLATOR PLACEMENT      WITH STENT PLACEMENT    COLONOSCOPY      CORONARY ANGIOPLASTY WITH STENT PLACEMENT      HYSTERECTOMY (CERVIX STATUS UNKNOWN)      PARTIAL     Medications Prior to Admission:   No current facility-administered medications on file prior to encounter.     Current Outpatient Medications on File Prior to Encounter   Medication Sig Dispense Refill    albuterol sulfate HFA (PROAIR HFA) 108 (90 Base) MCG/ACT inhaler Inhale 2 puffs into the lungs every 4 hours as needed      amiodarone (CORDARONE) 200 MG tablet Take 1 tablet by mouth daily      apixaban (ELIQUIS) 5 MG TABS tablet Take 5 mg by mouth 2 times daily (Patient not taking: Reported on 2024)      aspirin 81 MG tablet Take 1 tablet by mouth daily      atorvastatin (LIPITOR) 40 MG tablet Take 1 tablet by mouth at bedtime      budesonide-formoterol (SYMBICORT) 80-4.5 MCG/ACT AERO Inhale 2 puffs into the lungs 2 times daily      carvedilol (COREG) 25 MG tablet Take 1 tablet by mouth 2 times daily (with meals)      vitamin D (CHOLECALCIFEROL) 1000 UNIT TABS tablet Take 1 tablet by mouth daily      clopidogrel (PLAVIX) 75 MG tablet Take 75 mg by mouth daily (Patient not taking: Reported on 2024)      fenofibrate 160 MG tablet Take 1 tablet by mouth daily      losartan (COZAAR) 50 MG tablet Take 1 tablet by mouth at bedtime

## 2024-02-29 NOTE — PROGRESS NOTES
Patient awake and alert. Resp easy unlabored on room air O2 with SaO2 100%. Abdomen rounded soft. VSS. IV patent. Patient denies C/O pain or nausea. Patient stable to transfer to ACU for phase II.

## 2024-02-29 NOTE — ANESTHESIA PRE PROCEDURE
[facility] Department of Anesthesiology  Pre-Anesthesia Evaluation/Consultation       Name:  Shannon Carolina  : 1945  Age:  79 y.o.                                           MRN:  [id number]  Date: 2024           Surgeon: Surgeon(s):  Lisseth Bahena MD    Procedure: Procedure(s):  COLONOSCOPY     Allergies   Allergen Reactions   • Levofloxacin Hives and Shortness Of Breath     \"Years ago\"     There is no problem list on file for this patient.    Past Medical History:   Diagnosis Date   • Arthritis    • Asthma    • CAD (coronary artery disease)    • COPD (chronic obstructive pulmonary disease) (HCC)    • Fatty liver    • Heart attack (HCC)     STENTS X3   • Hyperlipidemia    • Hypertension      Past Surgical History:   Procedure Laterality Date   • ARM SURGERY Right     AGE 7   • CARDIAC DEFIBRILLATOR PLACEMENT      WITH STENT PLACEMENT   • COLONOSCOPY     • CORONARY ANGIOPLASTY WITH STENT PLACEMENT     • HYSTERECTOMY (CERVIX STATUS UNKNOWN)      PARTIAL     Social History     Tobacco Use   • Smoking status: Never   • Smokeless tobacco: Never   Vaping Use   • Vaping Use: Never used   Substance Use Topics   • Alcohol use: Yes     Comment: occas   • Drug use: Never     Medications  No current facility-administered medications on file prior to encounter.     Current Outpatient Medications on File Prior to Encounter   Medication Sig Dispense Refill   • albuterol sulfate HFA (PROAIR HFA) 108 (90 Base) MCG/ACT inhaler Inhale 2 puffs into the lungs every 4 hours as needed     • amiodarone (CORDARONE) 200 MG tablet Take 200 mg by mouth daily     • apixaban (ELIQUIS) 5 MG TABS tablet Take 5 mg by mouth 2 times daily     • aspirin 81 MG tablet Take 81 mg by mouth daily     • atorvastatin (LIPITOR) 40 MG tablet Take 1 tablet by mouth at bedtime     • budesonide-formoterol (SYMBICORT) 80-4.5 MCG/ACT AERO Inhale 2 puffs into the lungs 2 times daily     • carvedilol (COREG) 25 MG tablet Take 25 mg

## 2024-02-29 NOTE — OP NOTE
the course of the procedure to achieve an adequate level of conscious sedation. After digital examination of the rectum was performed, the Olympus PCF was inserted into the rectum and advanced under direct vision to the cecum, which was identified by the appendiceal orifice and ileocecal valve . The procedure was considered more difficult than average. Additional maneuvers used to reach the cecum: abdominal pressure . Overall the patient tolerated the procedure well, without undue discomfort, hypotension or desaturation. The patient was adequately recovered in the endoscopy suite and was discharged to home.    The preparation was good.   During withdrawal examination, the final quality of the prep was [place] Bowel Prep Scale: Right Colon: Grade 3 Transverse Colon: Grade 2 Left Colon: Grade 3     Additional rinsing and suctioning were necessary to obtain adequate views.  A careful inspection was made as the colonoscope was withdrawn.  This did include a retroflexed evaluation of the rectum.  Findings and interventions are described below. Appropriate photo documentation was obtained.      Terminal ileum: Not examined     The scope was then withdrawn back through the cecum, ascending, transverse, descending, sigmoid colon, and rectum.  Careful circumferential examination of the mucosa in these areas demonstrated:    Four 2-8mm sessile polyp In the ascending colon removed with cold snare polypectomy    Two 2-5mm sessile polyp In the transverse colon removed with cold snare polypectomy    Three 4-8mm sessile polyp In the rectosigmoid colon removed with cold snare polypectomy    Mild diverticulosis in the sigmoid colon     Rectum:   The scope was then withdrawn into the rectum and retroflexed.  The retroflexed view of the anal verge and rectum demonstrates medium internal hemorrhoids .      The scope was straightened, the colon was decompressed and the scope was withdrawn from the patient.  The patient tolerated the

## 2024-02-29 NOTE — DISCHARGE INSTRUCTIONS
You had 9 polyps which were removed today.   You also have mild diverticulosis and hemorrhoids.     Recommendations:  Await pathology.  Repeat colonoscopy in 3 years.  .  The patient had biopsies taken today.  The patient should check patient portal for results in 7 days and call us if they have not heard from our office within 14 days.  Our number is [phone].          Discharge Instructions for Colonoscopy     Colonoscopy is a visual exam of the lining of the large intestine, also called the bowel or colon, with a colonoscope. A colonoscope is a flexible tube with a light and a viewing device. It allows the doctor to view the inside of the colon through a tiny video camera.     Colonoscopy is performed for many reasons: unexplained anemia , pain, diarrhea , bloody stools, cancer screening, among many other reasons.     Complications from a colonoscopy are rare. Some possible serious complications include perforated bowel (which might require surgery) and bleeding (which could require blood transfusion ). Minor complications include bloating, gas, and cramping that can last for 1-2 days after the procedure.     Because air is put into your colon during the procedure, it is normal to pass large amounts of air from your rectum. You may not have a bowel movement for 1-3 days after the procedure.     What You Will Need:  Someone to drive you home after the procedure     Steps to Take:  Home Care -  Rest when you get home.   Because the sedative will make you drowsy, don't drive, operate machinery, or make important decisions the day of the procedure.  Feelings of bloating, gas, or cramping may persist for 24 hours.   Diet -  Try sips of water first. If tolerated, resume bland food (scrambled eggs, toast, soup) first.  If tolerated, resume regular diet or the diet recommended by your physician.   Do not drink alcohol for 24 hours.   Physical Activity -  Ask your doctor when you will be able to return to work.   Do

## 2024-02-29 NOTE — PROGRESS NOTES
Patient to Phase 2 from PACU. VSS on room air. IV removed. Discharge instructions given. Family at bedside. All questions answered.

## 2024-02-29 NOTE — PROGRESS NOTES
Patient admitted to PACU from [facility]. Patient opens eyes to name, oriented. Resp easy unlabored on room air O2 with SaO2 97%. IV patent to left forearm. Abdomen soft. Patient denies C/O pain or nausea. VSS.

## (undated) DEVICE — SNARES COLD OVAL 10MM THIN

## (undated) DEVICE — TRAP POLYP ETRAP

## (undated) DEVICE — FORMALIN CLEAR VIAL 20 ML 10%

## (undated) DEVICE — ENDOSCOPY KIT: Brand: MEDLINE INDUSTRIES, INC.